# Patient Record
Sex: MALE | Race: WHITE | Employment: FULL TIME | URBAN - METROPOLITAN AREA
[De-identification: names, ages, dates, MRNs, and addresses within clinical notes are randomized per-mention and may not be internally consistent; named-entity substitution may affect disease eponyms.]

---

## 2018-10-08 ENCOUNTER — HOSPITAL ENCOUNTER (OUTPATIENT)
Dept: PREADMISSION TESTING | Age: 63
Discharge: HOME | End: 2018-10-08
Payer: COMMERCIAL

## 2018-10-08 DIAGNOSIS — Z01.818 PRE-OP TESTING: ICD-10-CM

## 2018-10-08 LAB
ALBUMIN SERPL-MCNC: 3.6 G/DL (ref 3.4–5)
ALBUMIN/GLOB SERPL: 1.6 {RATIO} (ref 0.8–1.7)
ALP SERPL-CCNC: 40 U/L (ref 45–117)
ALT SERPL-CCNC: 25 U/L (ref 16–61)
ANION GAP SERPL CALC-SCNC: 7 MMOL/L (ref 3–18)
APPEARANCE UR: CLEAR
APTT PPP: 29.3 SEC (ref 23–36.4)
AST SERPL-CCNC: 17 U/L (ref 15–37)
ATRIAL RATE: 51 BPM
BACTERIA SPEC CULT: NORMAL
BASOPHILS # BLD: 0.1 K/UL (ref 0–0.1)
BASOPHILS NFR BLD: 1 % (ref 0–2)
BILIRUB SERPL-MCNC: 0.6 MG/DL (ref 0.2–1)
BILIRUB UR QL: NEGATIVE
BUN SERPL-MCNC: 14 MG/DL (ref 7–18)
BUN/CREAT SERPL: 14 (ref 12–20)
CALCIUM SERPL-MCNC: 8.6 MG/DL (ref 8.5–10.1)
CALCULATED P AXIS, ECG09: 61 DEGREES
CALCULATED R AXIS, ECG10: 77 DEGREES
CALCULATED T AXIS, ECG11: 62 DEGREES
CHLORIDE SERPL-SCNC: 106 MMOL/L (ref 100–108)
CO2 SERPL-SCNC: 30 MMOL/L (ref 21–32)
COLOR UR: YELLOW
CREAT SERPL-MCNC: 1.02 MG/DL (ref 0.6–1.3)
DIAGNOSIS, 93000: NORMAL
DIFFERENTIAL METHOD BLD: ABNORMAL
EOSINOPHIL # BLD: 0.2 K/UL (ref 0–0.4)
EOSINOPHIL NFR BLD: 4 % (ref 0–5)
ERYTHROCYTE [DISTWIDTH] IN BLOOD BY AUTOMATED COUNT: 12.5 % (ref 11.6–14.5)
ERYTHROCYTE [SEDIMENTATION RATE] IN BLOOD: 4 MM/HR (ref 0–20)
EST. AVERAGE GLUCOSE BLD GHB EST-MCNC: 123 MG/DL
GLOBULIN SER CALC-MCNC: 2.3 G/DL (ref 2–4)
GLUCOSE SERPL-MCNC: 99 MG/DL (ref 74–99)
GLUCOSE UR STRIP.AUTO-MCNC: NEGATIVE MG/DL
HBA1C MFR BLD: 5.9 % (ref 4.5–5.6)
HCT VFR BLD AUTO: 37.5 % (ref 36–48)
HGB BLD-MCNC: 12.8 G/DL (ref 13–16)
HGB UR QL STRIP: NEGATIVE
INR PPP: 1 (ref 0.8–1.2)
KETONES UR QL STRIP.AUTO: NEGATIVE MG/DL
LEUKOCYTE ESTERASE UR QL STRIP.AUTO: NEGATIVE
LYMPHOCYTES # BLD: 1.5 K/UL (ref 0.9–3.6)
LYMPHOCYTES NFR BLD: 35 % (ref 21–52)
MCH RBC QN AUTO: 29.2 PG (ref 24–34)
MCHC RBC AUTO-ENTMCNC: 34.1 G/DL (ref 31–37)
MCV RBC AUTO: 85.4 FL (ref 74–97)
MONOCYTES # BLD: 0.4 K/UL (ref 0.05–1.2)
MONOCYTES NFR BLD: 9 % (ref 3–10)
NEUTS SEG # BLD: 2.2 K/UL (ref 1.8–8)
NEUTS SEG NFR BLD: 51 % (ref 40–73)
NITRITE UR QL STRIP.AUTO: NEGATIVE
P-R INTERVAL, ECG05: 198 MS
PH UR STRIP: 5.5 [PH] (ref 5–8)
PLATELET # BLD AUTO: 162 K/UL (ref 135–420)
PMV BLD AUTO: 9.6 FL (ref 9.2–11.8)
POTASSIUM SERPL-SCNC: 4 MMOL/L (ref 3.5–5.5)
PROT SERPL-MCNC: 5.9 G/DL (ref 6.4–8.2)
PROT UR STRIP-MCNC: NEGATIVE MG/DL
PROTHROMBIN TIME: 12.5 SEC (ref 11.5–15.2)
Q-T INTERVAL, ECG07: 442 MS
QRS DURATION, ECG06: 94 MS
QTC CALCULATION (BEZET), ECG08: 407 MS
RBC # BLD AUTO: 4.39 M/UL (ref 4.7–5.5)
SERVICE CMNT-IMP: NORMAL
SODIUM SERPL-SCNC: 143 MMOL/L (ref 136–145)
SP GR UR REFRACTOMETRY: 1.02 (ref 1–1.03)
UROBILINOGEN UR QL STRIP.AUTO: 1 EU/DL (ref 0.2–1)
VENTRICULAR RATE, ECG03: 51 BPM
WBC # BLD AUTO: 4.3 K/UL (ref 4.6–13.2)

## 2018-10-08 PROCEDURE — 85025 COMPLETE CBC W/AUTO DIFF WBC: CPT | Performed by: ORTHOPAEDIC SURGERY

## 2018-10-08 PROCEDURE — 85610 PROTHROMBIN TIME: CPT | Performed by: ORTHOPAEDIC SURGERY

## 2018-10-08 PROCEDURE — 93005 ELECTROCARDIOGRAM TRACING: CPT

## 2018-10-08 PROCEDURE — 83036 HEMOGLOBIN GLYCOSYLATED A1C: CPT | Performed by: ORTHOPAEDIC SURGERY

## 2018-10-08 PROCEDURE — 85652 RBC SED RATE AUTOMATED: CPT | Performed by: ORTHOPAEDIC SURGERY

## 2018-10-08 PROCEDURE — 80053 COMPREHEN METABOLIC PANEL: CPT | Performed by: ORTHOPAEDIC SURGERY

## 2018-10-08 PROCEDURE — 87641 MR-STAPH DNA AMP PROBE: CPT | Performed by: ORTHOPAEDIC SURGERY

## 2018-10-08 PROCEDURE — 81003 URINALYSIS AUTO W/O SCOPE: CPT | Performed by: ORTHOPAEDIC SURGERY

## 2018-10-08 PROCEDURE — 85730 THROMBOPLASTIN TIME PARTIAL: CPT | Performed by: ORTHOPAEDIC SURGERY

## 2018-10-08 PROCEDURE — 36415 COLL VENOUS BLD VENIPUNCTURE: CPT | Performed by: ORTHOPAEDIC SURGERY

## 2018-10-29 PROBLEM — M16.11 LOCALIZED OSTEOARTHROSIS OF RIGHT HIP: Chronic | Status: ACTIVE | Noted: 2018-10-29

## 2018-10-29 NOTE — DISCHARGE INSTRUCTIONS
[address] [facility]   Patient Discharge Instructions    Ryan Mrurell / [id number] : 1955    Admitted (Not on file) Discharged: 10/29/2018     IF YOU HAVE ANY PROBLEMS ONCE YOU ARE  [facility] FOLLOWING NUMBERS:   Main office number: [phone]    Your follow up appointment to see either Dr. Merrilyn Koyanagi PA-C, or Tenzin Jonas PA-C as scheduled in 2 weeks. If you are unsure of your appointment date call the office at [phone]. Medication Instructions     · Resume your home medictions as directed, you may have directed not to resume supplements until after your follow up. · A prescription for pain medication has been given   · It is important that you take the medication exactly as they are prescribed. · Keep your medication in the bottles provided by the pharmacist and keep a list of the medication names, dosages, and times to be taken in your wallet. · Do not take other medications without consulting your doctor. What to do at [address] [place] [address] Ave your prehospital diet. If you have excessive nausea or vomitting call your doctor's office. Be sure to maintain adequate fluid intake. Some pain medications may cause constipation. Remember to drink fluids, stay as active as possible, and eat plenty of fiber-rich foods. Begin In-Home Physical Therapy; 3 times a week to work on gait training, range of motion, strengthening, and weight bearing exercises as tolerable. Continue to use your walker or cane when walking. May progress from the walker to a cane to complete total bearing as tolerable. Patient may shower. Wrap incision with plastic wrap/covering to prevent incision from getting wet. Avoid complete immersion. YOUR DRESSING SHOULD BE CHANGED BY YOUR HOME HEALTH NURSE 3-5 DAYS AFTER SURGERY.           When to Call    - Call if you have a temperature greater then 101  - Unable to keep food down  - Are unable to bear any wieght   - Need a pain medication refill     Information obtained by :  I understand that if any problems occur once I am at home I am to contact my physician. I understand and acknowledge receipt of the instructions indicated above.                                                                                                                                            Physician's or R.N.'s Signature                                                                  Date/Time                                                                                                                                              Patient or Representative Signature                                                          Date/Time

## 2018-10-29 NOTE — H&P
[address] History and Physical Exam 
 
Patient: Paulie Born MRN: [id number]  SSN: [id number] YOB: 1955  Age: 58 y.o. Sex: male Subjective: Chief Complaint: right hip pain History of Present Illness:  Patient complains of right hip pain and difficulty ambulating, which has progressed over the past several months. X-rays showed osteoarthritis of the joint. The patient's pain has persisted and progressed despite conservative treatments and therapies. The patient has been previously treated with nsaids. The patient has at this time opted for surgical intervention. Past Medical History:  
Diagnosis Date  Arthritis  Coagulation disorder ([facility] Eliquis  GERD (gastroesophageal reflux disease)  Ill-defined condition O2 drops down to 84 % O2 sat. after walking 25 yards due to Oxycodone use.  Localized osteoarthrosis of right hip 10/29/2018  Sleep apnea CPAP  Thromboembolus [facility]) 2004, 2011 DVT's and PE Past Surgical History:  
Procedure Laterality Date  HX ORTHOPAEDIC  2004  
 left hip replacement  HX ORTHOPAEDIC  2004  
 left knee arthroscopy  HX ORTHOPAEDIC  2005  
 left shoulder arthroscopy  NEUROLOGICAL PROCEDURE UNLISTED  2003  
 spinal stenosis L4-5 Social History Occupational History  Not on file Tobacco Use  Smoking status: Former Smoker Last attempt to quit: 10/24/2003 Years since quitting: 15.0  Smokeless tobacco: Never Used Substance and Sexual Activity  Alcohol use: No  
  Frequency: Never  Drug use: No  
 Sexual activity: Yes Prior to Admission medications Medication Sig Start Date End Date Taking? Authorizing Provider  
raNITIdine hcl 150 mg capsule Take 150 mg by mouth two (2) times a day. Provider, Historical  
tiZANidine (ZANAFLEX) 4 mg capsule Take 4 mg by mouth every six (6) hours.     Provider, Historical  
 pregabalin (LYRICA) 150 mg capsule Take 150 mg by mouth four (4) times daily. Provider, Historical  
apixaban (ELIQUIS) 2.5 mg tablet Take 2.5 mg by mouth two (2) times a day. Provider, Historical  
traZODone (DESYREL) 100 mg tablet Take 100 mg by mouth nightly. Provider, Historical  
lovastatin (MEVACOR) 40 mg tablet Take 40 mg by mouth nightly. Provider, Historical  
oxyCODONE IR (ROXICODONE) 10 mg tab immediate release tablet Take 10 mg by mouth every eight (8) hours as needed. Provider, Historical  
melatonin tab tablet Take 10 mg by mouth nightly. Provider, Historical  
sildenafil citrate (VIAGRA) 100 mg tablet Take 50 mg by mouth as needed. Provider, Historical  
ketoconazole (NIZORAL) 2 % shampoo Apply  to affected area daily as needed for Itching. Provider, Historical  
enoxaparin (LOVENOX) 120 mg/0.8 mL injection 120 mg by SubCUTAneous route. Pt to start Lovenox 5 days prior to surgery once daily, stopping on Nov 4. Per  1610 Nathanael Bangura per pt    Provider, Historical  
 
 
Allergies: Allergies Allergen Reactions  Hydrocodone Itching  Soma [Carisoprodol] Unknown (comments) Pt can't remember  Sulfa (Sulfonamide Antibiotics) Other (comments) Skin peels Review of Systems: A comprehensive review of systems was negative except for that written in the History of Present Illness. Objective:   
  
Physical Exam: 
HEENT: Normocephalic, atraumatic Lungs:  Clear to auscultation Heart:   Regular rate and rhythm Abdomen: Soft Extremities:  Pain with range of motion of the right hip. Passive flexion  degrees, 
                     passive internal rotation 0-10 degrees, with pain throughout ROM,  
                     passive external rotation 10-20 degrees with pain at the arc of motion. Antalgic gait noted. Assessment:   
 
Arthritis of the right hip. Plan:    
 
Proceed with scheduled RIGHT TOTAL HIP ARTHROPLASTY. The various methods of treatment have been discussed with the patient and family. After consideration of risks, benefits, and other options for treatment, the patient has consented to surgical interventions. Questions were answered and preoperative teaching was done by Dr Jovani Grey. Signed By: MAXIMO Dailey October 29, 2018

## 2018-11-04 ENCOUNTER — ANESTHESIA EVENT (OUTPATIENT)
Dept: SURGERY | Age: 63
DRG: 470 | End: 2018-11-04
Payer: COMMERCIAL

## 2018-11-05 ENCOUNTER — APPOINTMENT (OUTPATIENT)
Dept: GENERAL RADIOLOGY | Age: 63
DRG: 470 | End: 2018-11-05
Attending: ORTHOPAEDIC SURGERY
Payer: COMMERCIAL

## 2018-11-05 ENCOUNTER — ANESTHESIA (OUTPATIENT)
Dept: SURGERY | Age: 63
DRG: 470 | End: 2018-11-05
Payer: COMMERCIAL

## 2018-11-05 ENCOUNTER — HOSPITAL ENCOUNTER (INPATIENT)
Age: 63
LOS: 1 days | Discharge: HOME HEALTH | DRG: 470 | End: 2018-11-06
Attending: ORTHOPAEDIC SURGERY | Admitting: ORTHOPAEDIC SURGERY
Payer: COMMERCIAL

## 2018-11-05 DIAGNOSIS — M16.11 LOCALIZED OSTEOARTHROSIS OF RIGHT HIP: Primary | Chronic | ICD-10-CM

## 2018-11-05 LAB
ABO + RH BLD: NORMAL
BLOOD GROUP ANTIBODIES SERPL: NORMAL
GLUCOSE BLD STRIP.AUTO-MCNC: 107 MG/DL (ref 70–110)
SPECIMEN EXP DATE BLD: NORMAL

## 2018-11-05 PROCEDURE — 73501 X-RAY EXAM HIP UNI 1 VIEW: CPT

## 2018-11-05 PROCEDURE — 65270000029 HC RM PRIVATE

## 2018-11-05 PROCEDURE — 77030031139 HC SUT VCRL2 J&J -A: Performed by: ORTHOPAEDIC SURGERY

## 2018-11-05 PROCEDURE — 77030033263 HC DRSG MEPILEX 16-48IN BORD MOLN -B: Performed by: ORTHOPAEDIC SURGERY

## 2018-11-05 PROCEDURE — 97161 PT EVAL LOW COMPLEX 20 MIN: CPT

## 2018-11-05 PROCEDURE — 74011250636 HC RX REV CODE- 250/636: Performed by: PHYSICIAN ASSISTANT

## 2018-11-05 PROCEDURE — 82962 GLUCOSE BLOOD TEST: CPT

## 2018-11-05 PROCEDURE — C1776 JOINT DEVICE (IMPLANTABLE): HCPCS | Performed by: ORTHOPAEDIC SURGERY

## 2018-11-05 PROCEDURE — 77030038010: Performed by: ORTHOPAEDIC SURGERY

## 2018-11-05 PROCEDURE — 77030032490 HC SLV COMPR SCD KNE COVD -B: Performed by: ORTHOPAEDIC SURGERY

## 2018-11-05 PROCEDURE — 77030013708 HC HNDPC SUC IRR PULS STRY –B: Performed by: ORTHOPAEDIC SURGERY

## 2018-11-05 PROCEDURE — 77030034694 HC SCPL CANADY PLSM DISP USMD -E: Performed by: ORTHOPAEDIC SURGERY

## 2018-11-05 PROCEDURE — 74011000250 HC RX REV CODE- 250: Performed by: ORTHOPAEDIC SURGERY

## 2018-11-05 PROCEDURE — 74011250637 HC RX REV CODE- 250/637: Performed by: PHYSICIAN ASSISTANT

## 2018-11-05 PROCEDURE — 74011250636 HC RX REV CODE- 250/636: Performed by: ORTHOPAEDIC SURGERY

## 2018-11-05 PROCEDURE — 77030018836 HC SOL IRR NACL ICUM -A: Performed by: ORTHOPAEDIC SURGERY

## 2018-11-05 PROCEDURE — 76210000006 HC OR PH I REC 0.5 TO 1 HR: Performed by: ORTHOPAEDIC SURGERY

## 2018-11-05 PROCEDURE — 0SR904A REPLACEMENT OF RIGHT HIP JOINT WITH CERAMIC ON POLYETHYLENE SYNTHETIC SUBSTITUTE, UNCEMENTED, OPEN APPROACH: ICD-10-PCS | Performed by: ORTHOPAEDIC SURGERY

## 2018-11-05 PROCEDURE — 74011250637 HC RX REV CODE- 250/637: Performed by: SPECIALIST

## 2018-11-05 PROCEDURE — 86900 BLOOD TYPING SEROLOGIC ABO: CPT | Performed by: ORTHOPAEDIC SURGERY

## 2018-11-05 PROCEDURE — 77030003666 HC NDL SPINAL BD -A: Performed by: ORTHOPAEDIC SURGERY

## 2018-11-05 PROCEDURE — 77030027138 HC INCENT SPIROMETER -A: Performed by: ORTHOPAEDIC SURGERY

## 2018-11-05 PROCEDURE — 77010033678 HC OXYGEN DAILY

## 2018-11-05 PROCEDURE — 77030020782 HC GWN BAIR PAWS FLX 3M -B: Performed by: ORTHOPAEDIC SURGERY

## 2018-11-05 PROCEDURE — 77030012890

## 2018-11-05 PROCEDURE — 76010000153 HC OR TIME 1.5 TO 2 HR: Performed by: ORTHOPAEDIC SURGERY

## 2018-11-05 PROCEDURE — 36415 COLL VENOUS BLD VENIPUNCTURE: CPT | Performed by: ORTHOPAEDIC SURGERY

## 2018-11-05 PROCEDURE — 74011250636 HC RX REV CODE- 250/636

## 2018-11-05 PROCEDURE — 77030039267 HC ADH SKN EXOFIN S2SG -B: Performed by: ORTHOPAEDIC SURGERY

## 2018-11-05 PROCEDURE — 76060000034 HC ANESTHESIA 1.5 TO 2 HR: Performed by: ORTHOPAEDIC SURGERY

## 2018-11-05 PROCEDURE — 74011000258 HC RX REV CODE- 258: Performed by: ORTHOPAEDIC SURGERY

## 2018-11-05 PROCEDURE — 77030002934 HC SUT MCRYL J&J -B: Performed by: ORTHOPAEDIC SURGERY

## 2018-11-05 PROCEDURE — 97116 GAIT TRAINING THERAPY: CPT

## 2018-11-05 DEVICE — LINER ACET OD58MM ID36MM HIP ALTRX PINN: Type: IMPLANTABLE DEVICE | Site: HIP | Status: FUNCTIONAL

## 2018-11-05 DEVICE — CUP ACET DIA58MM HIP GRIPTION PRI CEMENTLESS FIX SECT SER: Type: IMPLANTABLE DEVICE | Site: HIP | Status: FUNCTIONAL

## 2018-11-05 DEVICE — COMPONENT TOT HIP PRIMARY CERM ALTRX: Type: IMPLANTABLE DEVICE | Site: HIP | Status: FUNCTIONAL

## 2018-11-05 DEVICE — HEAD FEM DIA36MM +5MM OFFSET 12/14 TAPR HIP CERAMIC BIOLOX: Type: IMPLANTABLE DEVICE | Site: HIP | Status: FUNCTIONAL

## 2018-11-05 RX ORDER — OXYCODONE AND ACETAMINOPHEN 5; 325 MG/1; MG/1
TABLET ORAL
Qty: 60 TAB | Refills: 0 | Status: SHIPPED | OUTPATIENT
Start: 2018-11-05

## 2018-11-05 RX ORDER — ZOLPIDEM TARTRATE 5 MG/1
5-10 TABLET ORAL
Status: DISCONTINUED | OUTPATIENT
Start: 2018-11-05 | End: 2018-11-06 | Stop reason: HOSPADM

## 2018-11-05 RX ORDER — MAGNESIUM SULFATE 100 %
4 CRYSTALS MISCELLANEOUS AS NEEDED
Status: DISCONTINUED | OUTPATIENT
Start: 2018-11-05 | End: 2018-11-05 | Stop reason: HOSPADM

## 2018-11-05 RX ORDER — PREGABALIN 75 MG/1
150 CAPSULE ORAL 4 TIMES DAILY
Status: DISCONTINUED | OUTPATIENT
Start: 2018-11-05 | End: 2018-11-06 | Stop reason: HOSPADM

## 2018-11-05 RX ORDER — CEFAZOLIN SODIUM/WATER 2 G/20 ML
2 SYRINGE (ML) INTRAVENOUS EVERY 8 HOURS
Status: COMPLETED | OUTPATIENT
Start: 2018-11-05 | End: 2018-11-06

## 2018-11-05 RX ORDER — TRAZODONE HYDROCHLORIDE 100 MG/1
100 TABLET ORAL
Status: DISCONTINUED | OUTPATIENT
Start: 2018-11-05 | End: 2018-11-06 | Stop reason: HOSPADM

## 2018-11-05 RX ORDER — ONDANSETRON 2 MG/ML
4 INJECTION INTRAMUSCULAR; INTRAVENOUS
Status: DISCONTINUED | OUTPATIENT
Start: 2018-11-05 | End: 2018-11-05

## 2018-11-05 RX ORDER — DIPHENHYDRAMINE HCL 25 MG
25 CAPSULE ORAL
Status: DISCONTINUED | OUTPATIENT
Start: 2018-11-05 | End: 2018-11-06 | Stop reason: HOSPADM

## 2018-11-05 RX ORDER — SODIUM CHLORIDE 0.9 % (FLUSH) 0.9 %
5-10 SYRINGE (ML) INJECTION AS NEEDED
Status: DISCONTINUED | OUTPATIENT
Start: 2018-11-05 | End: 2018-11-05 | Stop reason: HOSPADM

## 2018-11-05 RX ORDER — DOCUSATE SODIUM 100 MG/1
100 CAPSULE, LIQUID FILLED ORAL 2 TIMES DAILY
Status: DISCONTINUED | OUTPATIENT
Start: 2018-11-05 | End: 2018-11-06 | Stop reason: HOSPADM

## 2018-11-05 RX ORDER — SODIUM CHLORIDE, SODIUM LACTATE, POTASSIUM CHLORIDE, CALCIUM CHLORIDE 600; 310; 30; 20 MG/100ML; MG/100ML; MG/100ML; MG/100ML
100 INJECTION, SOLUTION INTRAVENOUS CONTINUOUS
Status: DISCONTINUED | OUTPATIENT
Start: 2018-11-05 | End: 2018-11-05 | Stop reason: HOSPADM

## 2018-11-05 RX ORDER — SODIUM CHLORIDE 0.9 % (FLUSH) 0.9 %
5-10 SYRINGE (ML) INJECTION AS NEEDED
Status: DISCONTINUED | OUTPATIENT
Start: 2018-11-05 | End: 2018-11-06 | Stop reason: HOSPADM

## 2018-11-05 RX ORDER — HYDROMORPHONE HYDROCHLORIDE 2 MG/ML
INJECTION, SOLUTION INTRAMUSCULAR; INTRAVENOUS; SUBCUTANEOUS AS NEEDED
Status: DISCONTINUED | OUTPATIENT
Start: 2018-11-05 | End: 2018-11-05 | Stop reason: HOSPADM

## 2018-11-05 RX ORDER — LOVASTATIN 20 MG/1
40 TABLET ORAL
Status: DISCONTINUED | OUTPATIENT
Start: 2018-11-05 | End: 2018-11-06 | Stop reason: HOSPADM

## 2018-11-05 RX ORDER — RANITIDINE 150 MG/1
150 TABLET, FILM COATED ORAL 2 TIMES DAILY
Status: DISCONTINUED | OUTPATIENT
Start: 2018-11-05 | End: 2018-11-06 | Stop reason: HOSPADM

## 2018-11-05 RX ORDER — METOCLOPRAMIDE HYDROCHLORIDE 5 MG/ML
10 INJECTION INTRAMUSCULAR; INTRAVENOUS
Status: DISCONTINUED | OUTPATIENT
Start: 2018-11-05 | End: 2018-11-06 | Stop reason: HOSPADM

## 2018-11-05 RX ORDER — FENTANYL CITRATE 50 UG/ML
25 INJECTION, SOLUTION INTRAMUSCULAR; INTRAVENOUS AS NEEDED
Status: DISCONTINUED | OUTPATIENT
Start: 2018-11-05 | End: 2018-11-05 | Stop reason: HOSPADM

## 2018-11-05 RX ORDER — DEXTROSE 50 % IN WATER (D50W) INTRAVENOUS SYRINGE
25-50 AS NEEDED
Status: DISCONTINUED | OUTPATIENT
Start: 2018-11-05 | End: 2018-11-05 | Stop reason: HOSPADM

## 2018-11-05 RX ORDER — ONDANSETRON 2 MG/ML
4 INJECTION INTRAMUSCULAR; INTRAVENOUS ONCE
Status: DISCONTINUED | OUTPATIENT
Start: 2018-11-05 | End: 2018-11-05 | Stop reason: HOSPADM

## 2018-11-05 RX ORDER — TIZANIDINE 4 MG/1
4 TABLET ORAL EVERY 6 HOURS
Status: DISCONTINUED | OUTPATIENT
Start: 2018-11-05 | End: 2018-11-06 | Stop reason: HOSPADM

## 2018-11-05 RX ORDER — LANOLIN ALCOHOL/MO/W.PET/CERES
1 CREAM (GRAM) TOPICAL 3 TIMES DAILY
Status: DISCONTINUED | OUTPATIENT
Start: 2018-11-05 | End: 2018-11-06 | Stop reason: HOSPADM

## 2018-11-05 RX ORDER — CHOLECALCIFEROL (VITAMIN D3) 125 MCG
10 CAPSULE ORAL
Status: DISCONTINUED | OUTPATIENT
Start: 2018-11-05 | End: 2018-11-06 | Stop reason: HOSPADM

## 2018-11-05 RX ORDER — HYDROMORPHONE HYDROCHLORIDE 2 MG/ML
0.5 INJECTION, SOLUTION INTRAMUSCULAR; INTRAVENOUS; SUBCUTANEOUS
Status: DISCONTINUED | OUTPATIENT
Start: 2018-11-05 | End: 2018-11-05 | Stop reason: HOSPADM

## 2018-11-05 RX ORDER — IBUPROFEN 200 MG
800 TABLET ORAL
COMMUNITY
End: 2018-11-06

## 2018-11-05 RX ORDER — KETAMINE HYDROCHLORIDE 10 MG/ML
INJECTION, SOLUTION INTRAMUSCULAR; INTRAVENOUS AS NEEDED
Status: DISCONTINUED | OUTPATIENT
Start: 2018-11-05 | End: 2018-11-05 | Stop reason: HOSPADM

## 2018-11-05 RX ORDER — ONDANSETRON 2 MG/ML
INJECTION INTRAMUSCULAR; INTRAVENOUS AS NEEDED
Status: DISCONTINUED | OUTPATIENT
Start: 2018-11-05 | End: 2018-11-05 | Stop reason: HOSPADM

## 2018-11-05 RX ORDER — PREGABALIN 75 MG/1
150 CAPSULE ORAL
Status: COMPLETED | OUTPATIENT
Start: 2018-11-05 | End: 2018-11-05

## 2018-11-05 RX ORDER — MIDAZOLAM HYDROCHLORIDE 1 MG/ML
INJECTION, SOLUTION INTRAMUSCULAR; INTRAVENOUS AS NEEDED
Status: DISCONTINUED | OUTPATIENT
Start: 2018-11-05 | End: 2018-11-05 | Stop reason: HOSPADM

## 2018-11-05 RX ORDER — DEXMEDETOMIDINE HYDROCHLORIDE 4 UG/ML
INJECTION, SOLUTION INTRAVENOUS AS NEEDED
Status: DISCONTINUED | OUTPATIENT
Start: 2018-11-05 | End: 2018-11-05 | Stop reason: HOSPADM

## 2018-11-05 RX ORDER — PREGABALIN 50 MG/1
50 CAPSULE ORAL ONCE
Status: DISCONTINUED | OUTPATIENT
Start: 2018-11-05 | End: 2018-11-05 | Stop reason: DRUGHIGH

## 2018-11-05 RX ORDER — LIDOCAINE HYDROCHLORIDE 20 MG/ML
INJECTION, SOLUTION EPIDURAL; INFILTRATION; INTRACAUDAL; PERINEURAL AS NEEDED
Status: DISCONTINUED | OUTPATIENT
Start: 2018-11-05 | End: 2018-11-05 | Stop reason: HOSPADM

## 2018-11-05 RX ORDER — PHENYLEPHRINE HCL IN 0.9% NACL 1 MG/10 ML
SYRINGE (ML) INTRAVENOUS AS NEEDED
Status: DISCONTINUED | OUTPATIENT
Start: 2018-11-05 | End: 2018-11-05 | Stop reason: HOSPADM

## 2018-11-05 RX ORDER — CEFAZOLIN SODIUM/WATER 2 G/20 ML
2 SYRINGE (ML) INTRAVENOUS ONCE
Status: COMPLETED | OUTPATIENT
Start: 2018-11-05 | End: 2018-11-05

## 2018-11-05 RX ORDER — NALOXONE HYDROCHLORIDE 0.4 MG/ML
0.4 INJECTION, SOLUTION INTRAMUSCULAR; INTRAVENOUS; SUBCUTANEOUS AS NEEDED
Status: DISCONTINUED | OUTPATIENT
Start: 2018-11-05 | End: 2018-11-06 | Stop reason: HOSPADM

## 2018-11-05 RX ORDER — DEXAMETHASONE SODIUM PHOSPHATE 4 MG/ML
4 INJECTION, SOLUTION INTRA-ARTICULAR; INTRALESIONAL; INTRAMUSCULAR; INTRAVENOUS; SOFT TISSUE ONCE
Status: COMPLETED | OUTPATIENT
Start: 2018-11-05 | End: 2018-11-05

## 2018-11-05 RX ORDER — ACETAMINOPHEN 10 MG/ML
1000 INJECTION, SOLUTION INTRAVENOUS EVERY 6 HOURS
Status: DISCONTINUED | OUTPATIENT
Start: 2018-11-05 | End: 2018-11-05 | Stop reason: DRUGHIGH

## 2018-11-05 RX ORDER — KETOROLAC TROMETHAMINE 30 MG/ML
30 INJECTION, SOLUTION INTRAMUSCULAR; INTRAVENOUS
Status: DISCONTINUED | OUTPATIENT
Start: 2018-11-05 | End: 2018-11-05 | Stop reason: HOSPADM

## 2018-11-05 RX ORDER — ESMOLOL HYDROCHLORIDE 10 MG/ML
INJECTION INTRAVENOUS AS NEEDED
Status: DISCONTINUED | OUTPATIENT
Start: 2018-11-05 | End: 2018-11-05 | Stop reason: HOSPADM

## 2018-11-05 RX ORDER — OXYCODONE HYDROCHLORIDE 5 MG/1
5-10 TABLET ORAL
Status: DISCONTINUED | OUTPATIENT
Start: 2018-11-05 | End: 2018-11-06 | Stop reason: HOSPADM

## 2018-11-05 RX ORDER — OXYCODONE HYDROCHLORIDE 5 MG/1
5 TABLET ORAL
Status: DISCONTINUED | OUTPATIENT
Start: 2018-11-05 | End: 2018-11-05 | Stop reason: HOSPADM

## 2018-11-05 RX ORDER — ONDANSETRON 2 MG/ML
4 INJECTION INTRAMUSCULAR; INTRAVENOUS
Status: DISCONTINUED | OUTPATIENT
Start: 2018-11-05 | End: 2018-11-06 | Stop reason: HOSPADM

## 2018-11-05 RX ORDER — SODIUM CHLORIDE 0.9 % (FLUSH) 0.9 %
5-10 SYRINGE (ML) INJECTION EVERY 8 HOURS
Status: DISCONTINUED | OUTPATIENT
Start: 2018-11-05 | End: 2018-11-06 | Stop reason: HOSPADM

## 2018-11-05 RX ORDER — EPHEDRINE SULFATE/0.9% NACL/PF 25 MG/5 ML
SYRINGE (ML) INTRAVENOUS AS NEEDED
Status: DISCONTINUED | OUTPATIENT
Start: 2018-11-05 | End: 2018-11-05 | Stop reason: HOSPADM

## 2018-11-05 RX ORDER — SODIUM CHLORIDE 9 MG/ML
125 INJECTION, SOLUTION INTRAVENOUS CONTINUOUS
Status: DISCONTINUED | OUTPATIENT
Start: 2018-11-05 | End: 2018-11-06 | Stop reason: HOSPADM

## 2018-11-05 RX ORDER — PANTOPRAZOLE SODIUM 40 MG/1
40 TABLET, DELAYED RELEASE ORAL DAILY
Status: DISCONTINUED | OUTPATIENT
Start: 2018-11-06 | End: 2018-11-06 | Stop reason: HOSPADM

## 2018-11-05 RX ORDER — ACETAMINOPHEN 500 MG
1000 TABLET ORAL ONCE
Status: COMPLETED | OUTPATIENT
Start: 2018-11-05 | End: 2018-11-05

## 2018-11-05 RX ORDER — PROPOFOL 10 MG/ML
INJECTION, EMULSION INTRAVENOUS AS NEEDED
Status: DISCONTINUED | OUTPATIENT
Start: 2018-11-05 | End: 2018-11-05 | Stop reason: HOSPADM

## 2018-11-05 RX ORDER — ACETAMINOPHEN 325 MG/1
650 TABLET ORAL EVERY 6 HOURS
Status: DISCONTINUED | OUTPATIENT
Start: 2018-11-05 | End: 2018-11-06 | Stop reason: HOSPADM

## 2018-11-05 RX ORDER — SODIUM CHLORIDE, SODIUM LACTATE, POTASSIUM CHLORIDE, CALCIUM CHLORIDE 600; 310; 30; 20 MG/100ML; MG/100ML; MG/100ML; MG/100ML
125 INJECTION, SOLUTION INTRAVENOUS CONTINUOUS
Status: DISCONTINUED | OUTPATIENT
Start: 2018-11-05 | End: 2018-11-06 | Stop reason: HOSPADM

## 2018-11-05 RX ORDER — DIPHENHYDRAMINE HYDROCHLORIDE 50 MG/ML
12.5 INJECTION, SOLUTION INTRAMUSCULAR; INTRAVENOUS
Status: DISCONTINUED | OUTPATIENT
Start: 2018-11-05 | End: 2018-11-06 | Stop reason: HOSPADM

## 2018-11-05 RX ORDER — GLYCOPYRROLATE 0.2 MG/ML
INJECTION INTRAMUSCULAR; INTRAVENOUS AS NEEDED
Status: DISCONTINUED | OUTPATIENT
Start: 2018-11-05 | End: 2018-11-05 | Stop reason: HOSPADM

## 2018-11-05 RX ADMIN — OXYCODONE HYDROCHLORIDE 5 MG: 5 TABLET ORAL at 19:46

## 2018-11-05 RX ADMIN — TRAZODONE HYDROCHLORIDE 100 MG: 100 TABLET ORAL at 22:19

## 2018-11-05 RX ADMIN — HYDROMORPHONE HYDROCHLORIDE 1 MG: 2 INJECTION, SOLUTION INTRAMUSCULAR; INTRAVENOUS; SUBCUTANEOUS at 12:59

## 2018-11-05 RX ADMIN — SODIUM CHLORIDE 125 ML/HR: 900 INJECTION, SOLUTION INTRAVENOUS at 19:55

## 2018-11-05 RX ADMIN — ACETAMINOPHEN 650 MG: 325 TABLET ORAL at 19:47

## 2018-11-05 RX ADMIN — Medication 2 G: at 12:56

## 2018-11-05 RX ADMIN — DEXMEDETOMIDINE HYDROCHLORIDE 8 MCG: 4 INJECTION, SOLUTION INTRAVENOUS at 13:20

## 2018-11-05 RX ADMIN — Medication 2 G: at 22:18

## 2018-11-05 RX ADMIN — Medication 100 MCG: at 12:54

## 2018-11-05 RX ADMIN — TRANEXAMIC ACID 1 G: 100 INJECTION, SOLUTION INTRAVENOUS at 12:55

## 2018-11-05 RX ADMIN — PREGABALIN 150 MG: 75 CAPSULE ORAL at 18:03

## 2018-11-05 RX ADMIN — DEXMEDETOMIDINE HYDROCHLORIDE 8 MCG: 4 INJECTION, SOLUTION INTRAVENOUS at 13:05

## 2018-11-05 RX ADMIN — DEXMEDETOMIDINE HYDROCHLORIDE 8 MCG: 4 INJECTION, SOLUTION INTRAVENOUS at 13:30

## 2018-11-05 RX ADMIN — Medication 10 MG: at 22:19

## 2018-11-05 RX ADMIN — PROPOFOL 250 MG: 10 INJECTION, EMULSION INTRAVENOUS at 12:50

## 2018-11-05 RX ADMIN — GLYCOPYRROLATE 0.2 MG: 0.2 INJECTION INTRAMUSCULAR; INTRAVENOUS at 12:46

## 2018-11-05 RX ADMIN — DEXAMETHASONE SODIUM PHOSPHATE 4 MG: 4 INJECTION, SOLUTION INTRAMUSCULAR; INTRAVENOUS at 12:36

## 2018-11-05 RX ADMIN — RANITIDINE 150 MG: 150 TABLET ORAL at 22:19

## 2018-11-05 RX ADMIN — DEXMEDETOMIDINE HYDROCHLORIDE 8 MCG: 4 INJECTION, SOLUTION INTRAVENOUS at 13:32

## 2018-11-05 RX ADMIN — LOVASTATIN 40 MG: 20 TABLET ORAL at 22:26

## 2018-11-05 RX ADMIN — HYDROMORPHONE HYDROCHLORIDE 1 MG: 2 INJECTION, SOLUTION INTRAMUSCULAR; INTRAVENOUS; SUBCUTANEOUS at 13:22

## 2018-11-05 RX ADMIN — TIZANIDINE 4 MG: 4 TABLET ORAL at 18:03

## 2018-11-05 RX ADMIN — TRANEXAMIC ACID 1 G: 100 INJECTION, SOLUTION INTRAVENOUS at 14:03

## 2018-11-05 RX ADMIN — SODIUM CHLORIDE, SODIUM LACTATE, POTASSIUM CHLORIDE, AND CALCIUM CHLORIDE 125 ML/HR: 600; 310; 30; 20 INJECTION, SOLUTION INTRAVENOUS at 11:54

## 2018-11-05 RX ADMIN — SODIUM CHLORIDE, SODIUM LACTATE, POTASSIUM CHLORIDE, AND CALCIUM CHLORIDE: 600; 310; 30; 20 INJECTION, SOLUTION INTRAVENOUS at 14:03

## 2018-11-05 RX ADMIN — FERROUS SULFATE TAB 325 MG (65 MG ELEMENTAL FE) 325 MG: 325 (65 FE) TAB at 18:03

## 2018-11-05 RX ADMIN — KETAMINE HYDROCHLORIDE 50 MG: 10 INJECTION, SOLUTION INTRAMUSCULAR; INTRAVENOUS at 13:00

## 2018-11-05 RX ADMIN — FERROUS SULFATE TAB 325 MG (65 MG ELEMENTAL FE) 325 MG: 325 (65 FE) TAB at 22:19

## 2018-11-05 RX ADMIN — PREGABALIN 150 MG: 75 CAPSULE ORAL at 22:23

## 2018-11-05 RX ADMIN — KETAMINE HYDROCHLORIDE 20 MG: 10 INJECTION, SOLUTION INTRAMUSCULAR; INTRAVENOUS at 13:22

## 2018-11-05 RX ADMIN — LIDOCAINE HYDROCHLORIDE 100 MG: 20 INJECTION, SOLUTION EPIDURAL; INFILTRATION; INTRACAUDAL; PERINEURAL at 12:50

## 2018-11-05 RX ADMIN — ACETAMINOPHEN 1000 MG: 500 TABLET, FILM COATED ORAL at 12:36

## 2018-11-05 RX ADMIN — Medication 10 MG: at 14:10

## 2018-11-05 RX ADMIN — MIDAZOLAM HYDROCHLORIDE 4 MG: 1 INJECTION, SOLUTION INTRAMUSCULAR; INTRAVENOUS at 12:46

## 2018-11-05 RX ADMIN — ESMOLOL HYDROCHLORIDE 50 MG: 10 INJECTION INTRAVENOUS at 13:37

## 2018-11-05 RX ADMIN — PREGABALIN 150 MG: 75 CAPSULE ORAL at 12:36

## 2018-11-05 RX ADMIN — ONDANSETRON 4 MG: 2 INJECTION INTRAMUSCULAR; INTRAVENOUS at 12:56

## 2018-11-05 RX ADMIN — DEXMEDETOMIDINE HYDROCHLORIDE 8 MCG: 4 INJECTION, SOLUTION INTRAVENOUS at 13:22

## 2018-11-05 RX ADMIN — DOCUSATE SODIUM 100 MG: 100 CAPSULE, LIQUID FILLED ORAL at 22:19

## 2018-11-05 NOTE — OP NOTES
[address] [facility] [address] Medicine  Total Hip Arthroplasty      Patient: Xu Hanson MRN: [id number]  SSN: [id number]    YOB: 1955  Age: 58 y.o. Sex: male      Date of Surgery: 11/5/2018   Preoperative Diagnosis: UNILATERAL PRIMARY OSTEOARTHRITIS,RIGHT HIP   Postoperative Diagnosis: UNILATERAL PRIMARY OSTEOARTHRITIS,RIGHT HIP   Location: [facility]  Surgeon: Fatou Preciado MD  Assistant: Sebastian Amador PA-C    Anesthesia: general    Procedure: Total Right Hip Arthroplasty    Findings: Degenerative joint disease of the right hip. Estimated Blood Loss: 300ml    Specimens: None    Complications: none    Implants:   Implant Name Type Inv. Item Serial No.  Lot No. LRB No. Used Action   CUP ACET SECTOR GRIPTION 58MM -- TI - [id number]  CUP ACET SECTOR GRIPTION 58MM -- TI  [facility] [employer] [id number] Right 1 Implanted   LINER ACET PINN NEUT [id number] -- ALTRX - [id number]  LINER ACET PINN NEUT [id number] -- ALTRX  [facility] [id number] Right 1 Implanted   FEMORAL STEM    [employer] OR[employer]O [id number] Right 1 Implanted   HEAD FEM S-ROM 36MM +5MM NK -- [employer] DELTA - [id number]  HEAD FEM S-ROM 36MM +5MM NK -- BIOLOX DELTA  [facility] [employer] [id number] Right 1 Implanted       Procedure Detail:  After the patient was brought to the operating suite, He was effectively anesthetized using general anesthesia, then transferred to the [place] table and secured in a standard fashion. His right hip was then prepped and draped in a normal sterile orthopedic fashion. He was given appropriate intravenous antibiotics preoperatively. After a proper timeout was performed, a direct anterior approach to the hip was performed using a short Pacheco-Mandel interval. Anterior capsulotomy was performed. The degenerative changes of the hip were noted. Femoral neck osteotomy was then performed to the templated area. The head and neck were removed. The pulvinar and labrum were excised.  The acetabulum was then reamed up to 58 mm with good bleeding cancellous bone obtained. The cup was then irrigated with pulse lavage system. A 58 mm Gription cup was then impacted in place with excellent stable fixation obtained, placing the cup at about 45 degrees of abduction, 20 degrees of anteversion. The liner was then impacted in place. A screw was not placed. Attention was turned to the femur, which was delivered into the wound with a combination of extension, external rotation, and adduction, and using the hook on the [place] table to deliver the femur into the wound. The canal was broached up to a size 6 for the Actis stem system with excellent stable fixation obtained. A trial reduction was then performed with the standard neck offset and 36 mm head balls with various neck lengths. With the +5, he appeared to have equalization of leg lengths and restoration of offset radiographically using the c-arm and joint point navigation system, and excellent functional stability was noted. The trial broach was removed. The canal was irrigated with the pulse lavage system. The final components were impacted in place with excellent stable fixation obtained once again. The final reduction was performed and once again leg lengths and offset were restored radiographically, using the C-arm radiographically intraoperatively, and excellent functional stability was noted. The wound was then irrigated one more time, and then closed in layers. The fascia of the tensor was closed with #1 Vicryl in a running type stitch. Subcutaneous tissue was closed with 2-0 Vicryl in a simple buried stitch, and the skin was closed with Prineo. Dry, sterile dressing was then applied. He tolerated this well, was transferred to the bed, and taken to recovery room, extubated, in stable condition. All sponge and needle counts were correct.     Signed By: Emilio Wyatt MD     November 5, 2018

## 2018-11-05 NOTE — INTERVAL H&P NOTE
H&P Update: 
Michael Dickson was seen and examined. History and physical has been reviewed. The patient has been examined.  There have been no significant clinical changes since the completion of the originally dated History and Physical. 
 
Signed By: Francesco Gee MD   
 November 5, 2018 11:28 AM

## 2018-11-05 NOTE — DISCHARGE SUMMARY
[address] [address]     DISCHARGE SUMMARY     PATIENT: Shama Sheriff     MRN: [id number]   ADMIT DATE: 2018   [place]   DISCHARGE DATE:      ATTENDING: Rosetta Castillo MD   DICTATING: MAXIMO Heath     ADMISSION DIAGNOSIS: UNILATERAL PRIMARY OSTEOARTHRITIS,RIGHT HIP  Arthritis of right hip    DISCHARGE DIAGNOSIS: Status post RIGHT TOTAL HIP ARTHROPLASTY    HISTORY OF PRESENT ILLNESS: The patient is a 58y.o. year-old male   with ongoing right hip pain secondary to osteoarthritis of right hip. The patient's pain has persisted and progressed despite conservative treatments and therapies. The patient has at this time opted for surgical intervention. PAST MEDICAL HISTORY:   Past Medical History:   Diagnosis Date    Arthritis     Coagulation disorder ([address]     Eliquis    GERD (gastroesophageal reflux disease)     Ill-defined condition     O2 drops down to 84 % O2 sat. after walking 25 yards due to Oxycodone use.  Localized osteoarthrosis of right hip 10/29/2018    Sleep apnea     CPAP    Thromboembolus ([address] ,     DVT's and PE       PAST SURGICAL HISTORY:   Past Surgical History:   Procedure Laterality Date    HX ORTHOPAEDIC  2004    left hip replacement    HX ORTHOPAEDIC      left knee arthroscopy    HX ORTHOPAEDIC      left shoulder arthroscopy    NEUROLOGICAL PROCEDURE UNLISTED      spinal stenosis L4-5       ALLERGIES:   Allergies   Allergen Reactions    Hydrocodone Itching    Soma [Carisoprodol] Unknown (comments)     Pt can't remember    Sulfa (Sulfonamide Antibiotics) Other (comments)     Skin peels        CURRENT MEDICATIONS:  A list of medications prior to the time of admission include:  Prior to Admission medications    Medication Sig Start Date End Date Taking?  Authorizing Provider   oxyCODONE-acetaminophen (PERCOCET) 5-325 mg per tablet Take 1 to 2 tab PO q 4-6 hrs prn pain 18 Yes Luis Fernando Durham PA   raNITIdine hcl 150 mg capsule Take 150 mg by mouth two (2) times a day. Yes Provider, Historical   tiZANidine (ZANAFLEX) 4 mg capsule Take 4 mg by mouth every six (6) hours. Yes Provider, Historical   pregabalin (LYRICA) 150 mg capsule Take 150 mg by mouth four (4) times daily. Yes Provider, Historical   traZODone (DESYREL) 100 mg tablet Take 100 mg by mouth nightly. Yes Provider, Historical   lovastatin (MEVACOR) 40 mg tablet Take 40 mg by mouth nightly. Yes Provider, Historical   oxyCODONE IR (ROXICODONE) 10 mg tab immediate release tablet Take 10 mg by mouth every eight (8) hours as needed. Yes Provider, Historical   melatonin tab tablet Take 10 mg by mouth nightly. Yes Provider, Historical   ketoconazole (NIZORAL) 2 % shampoo Apply  to affected area daily as needed for Itching. Yes Provider, Historical   enoxaparin (LOVENOX) 120 mg/0.8 mL injection 120 mg by SubCUTAneous route two (2) times a day. Pt to start Lovenox 5 days prior to surgery once daily, stopping on Nov 4. Per Dr. Yancy Masterson per pt to bridge   Pt started on 10/31/18   Yes Provider, Historical   ibuprofen (ADVIL) 200 mg tablet Take 800 mg by mouth two (2) times daily as needed for Pain. Provider, Historical   apixaban (ELIQUIS) 2.5 mg tablet Take 2.5 mg by mouth two (2) times a day. Provider, Historical   sildenafil citrate (VIAGRA) 100 mg tablet Take 50 mg by mouth as needed. Provider, Historical       FAMILY HISTORY: History reviewed. No pertinent family history.     SOCIAL HISTORY:   Social History     Socioeconomic History    Marital status:      Spouse name: Not on file    Number of children: Not on file    Years of education: Not on file    Highest education level: Not on file   Social Needs    Financial resource strain: Not on file    Food insecurity - worry: Not on file    Food insecurity - inability: Not on file    Transportation needs - medical: Not on file   [employer] needs - non-medical: Not on file   Occupational History    Not on file   Tobacco Use    Smoking status: Former Smoker     Last attempt to quit: 10/24/2003     Years since quitting: 15.0    Smokeless tobacco: Never Used   Substance and Sexual Activity    Alcohol use: No     Frequency: Never    Drug use: No    Sexual activity: Yes   Other Topics Concern    Not on file   Social History Narrative    Not on file       REVIEW OF SYSTEMS: All review of systems are negative. PHYSICAL EXAMINATION: For a detailed physical exam, please refer to the patient's chart. HOSPITAL COURSE: The patient was taken to surgery the day of admission. he underwent right total hip replacement via the anterior approach. Operative course was benign. Estimated blood loss approximately 300 cc. The patient was taken to the PACU in stable condition and was later taken to the floor in stable condition. Post-op Day #1, patient has done very well.  he has had little to no pain. he had been cleared by physical therapy with stair training. he was placed on Eliquis for DVT prophylaxis. his vitals have remained stable. he has also remained hemodynamically stable. The patient has been recommended for discharge home. DISCHARGE INSTRUCTIONS: The patient is to be discharged home. he is to continue on his prior medications per the medication reconciliation form, to which we will add:           1)  Percocet 5/325 mg; 1-2 tablets p.o. every 4 to 6 hours p.r.n. for pain    The patient is to continue at home with home physical therapy 3 times a week to work on gait training, range of motion, strengthening, and weightbearing exercises as tolerated on his right lower extremity. The patient is to progress from a walker to a cane to complete total weightbearing as tolerable. The patient is to continue to keep his incision dry.   The patient is to followup with Dr. Melissa Schwarz, [facility] PA-C, and/or [facility] in the office approximately 10-14 days status post for x-rays and further evaluation.       Daquan Davila 1723, [place]  11/06/18  7:13 AM

## 2018-11-05 NOTE — PERIOP NOTES
/Bulmaro at [facility], he was notified that pt took last dose of Eliquis on 10/630/18, started bridge with Lovenox 120 mg BID on 10/31/18, last dose of Lovenox was 11/04/18 @ 2200. Orders for IV Tranexamic acid see orders  and no IVF bolus at this time

## 2018-11-05 NOTE — ANESTHESIA PREPROCEDURE EVALUATION
Anesthetic History No history of anesthetic complications Pertinent negatives: No PONV Review of Systems / Medical History Patient summary reviewed, nursing notes reviewed and pertinent labs reviewed Pulmonary Sleep apnea: CPAP Pertinent negatives: No COPD, asthma and smoker Neuro/Psych Cardiovascular Pertinent negatives: No hypertension, past MI and CAD Comments: PE  
GI/Hepatic/Renal 
  
GERD Pertinent negatives: No liver disease and renal disease Endo/Other Arthritis Pertinent negatives: No diabetes Other Findings Comments: etoh neg Physical Exam 
 
Airway Mallampati: I 
TM Distance: 4 - 6 cm Neck ROM: normal range of motion Mouth opening: Normal 
 
 Cardiovascular Dental 
 
Dentition: Lower partial plate and Full upper dentures Pulmonary Abdominal 
 
 
 
 Other Findings Anesthetic Plan ASA: 2 Anesthesia type: general 
 
 
 
 
Induction: Intravenous Anesthetic plan and risks discussed with: Patient

## 2018-11-05 NOTE — PROGRESS NOTES
32 [phone] - Patient arrives to unit at this time. Dual skin assessment performed at this time, all WDL except for several spots of ecchymosis on abdomen from lovenox injections. Admission completed at this time. Patient is A/O x 4. IV to right FA intact and patent. TEDS and SCDS applied to BLE. Silver mepilex dressing to right hip CDI. Patient denies numbness/tingling. Pedal and radial pulses palpable. Bowel sounds active. Lungs clear bilaterally. Pain 2/10. Patient was oriented to the room to include use of call bell, meal ordering, and use of incentive spirometer patient able to get up to 3000 on IS. Patient was given explanation of \" up for dinner\" program and has verbalized understanding. Phone and call bell left within reach. Plan of care for the day addressed with patient. Educated on pain medication availability and possible side effects. 1610-This nurse called [facility] to report that patient did not have orders in sign and held for pain meds, fluids, diet, etc. This nurse took telephone order with readback for regular diet, roxicodone 5-10 mg PO Q4 PRN, and zofran 4mg PRN Q4. [facility] PA to place the rest of the orders once he gets home.

## 2018-11-05 NOTE — ROUTINE PROCESS
TRANSFER - IN REPORT: 
 
Verbal report received from Carlitos Olivarez RN on Miguelina Bunch  being received from PACU for routine post - op. Report consisted of patients Situation, Background, Assessment and  
Recommendations(SBAR). Information from the following report(s) SBAR, Kardex, OR Summary, Intake/Output and MAR was reviewed with the receiving nurse. Opportunity for questions and clarification was provided. Assessment to be completed upon patients arrival to unit and care assumed.

## 2018-11-05 NOTE — PROGRESS NOTES
Problem: Mobility Impaired (Adult and Pediatric) Goal: *Acute Goals and Plan of Care (Insert Text) In 1-7 days pt will be able to perform: ST.  Bed mobility:  Rolling L to R to L modified independent for positioning. 2.  Supine to sit to supine S with HR for meals. 3.  Sit to stand to sit S with RW in prep for ambulation. LT.  Gait:  Ambulate >150ft S with RW, WBAT, for home/community mobility. 2.  Stair Negotiation:  Ascend/descend >2 steps CGA with HR for home entry. 3.  Activity tolerance: Tolerate up in chair 1-2 hours for ADLs. 4.  Patient/Family Education:  Patient/family to be independent with HEP for follow-up care and safe discharge. physical Therapy EVALUATION Patient: Michael Dickson (84 y.o. male) Date: 2018 Primary Diagnosis: UNILATERAL PRIMARY OSTEOARTHRITIS,RIGHT HIP Arthritis of right hip Procedure(s) (LRB): 
RIGHT TOTAL HIP REPLACEMENT ANTERIOR APPROACH W/C-ARM, \"SPEC POP\" (Right) Day of Surgery Precautions:   Fall, WBAT 
 
ASSESSMENT : 
Based on the objective data described below, the patient presents with decreased functional mobility and independence in regard to bed mobility, transfers, gt quality and tolerance, R hip strength, pain, stair negotiation and safety due to recent R FREDERICK surgery. Pt rating pain in R hip 1/10 on numerical pain scale. Pt required CGA/SBA for supine<>sit<>stand. Pt able to participate in gt training w/ RW, WBAT, GB and CGA in hallway w/ antalgic pattern. Pt returned to supine in bed w/ all needs within reach, SCDs applied and ice pack to R hip. Nurse Guerra Notice aware. Recommend Located within [facility] upon hospital d/c. Patient will benefit from skilled intervention to address the above impairments. Patients rehabilitation potential is considered to be Good Factors which may influence rehabilitation potential include:  
[]         None noted 
[]         Mental ability/status []         Medical condition []         Home/family situation and support systems 
[]         Safety awareness [x]         Pain tolerance/management 
[]         Other: PLAN : 
Recommendations and Planned Interventions: 
[x]           Bed Mobility Training             []    Neuromuscular Re-Education 
[x]           Transfer Training                   []    Orthotic/Prosthetic Training 
[x]           Gait Training                          []    Modalities [x]           Therapeutic Exercises          []    Edema Management/Control 
[x]           Therapeutic Activities            [x]    Patient and Family Training/Education 
[]           Other (comment): Frequency/Duration: Patient will be followed by physical therapy twice daily to address goals. Discharge Recommendations: Home Health Further Equipment Recommendations for Discharge: N/A  
 
SUBJECTIVE:  
Patient stated I have been waiting for you.  OBJECTIVE DATA SUMMARY:  
 
Past Medical History:  
Diagnosis Date  Arthritis  Coagulation disorder ([facility] [address] Eliquis  GERD (gastroesophageal reflux disease)  Ill-defined condition O2 drops down to 84 % O2 sat. after walking 25 yards due to Oxycodone use.  Localized osteoarthrosis of right hip 10/29/2018  Sleep apnea CPAP  Thromboembolus [facility]) 2004, 2011 DVT's and PE Past Surgical History:  
Procedure Laterality Date  HX ORTHOPAEDIC  2004  
 left hip replacement  HX ORTHOPAEDIC  2004  
 left knee arthroscopy  HX ORTHOPAEDIC  2005  
 left shoulder arthroscopy  NEUROLOGICAL PROCEDURE UNLISTED  2003  
 spinal stenosis L4-5 Barriers to Learning/Limitations: None Compensate with: visual, verbal, tactile, kinesthetic cues/model Prior Level of Function/Home Situation:  
Home Situation Home Environment: Private residence # Steps to Enter: 3 Rails to Enter: Yes Hand Rails : Left One/Two Story Residence: Two story # of Interior Steps: 15 Interior Rails: Right Lift Chair Available: No 
Living Alone: No 
Support Systems: Spouse/Significant Other/Partner, Family member(s) Patient Expects to be Discharged to[de-identified] Private residence Current DME Used/Available at Home: Cane, straight, Walker, rolling, CPAPCritical Behavior: 
Neurologic State: Alert; Appropriate for age Orientation Level: Oriented X4 Cognition: Appropriate decision making; Appropriate for age attention/concentration; Appropriate safety awareness; Follows commands Safety/Judgement: Awareness of environment Psychosocial 
Patient Behaviors: Calm; Cooperative Purposeful Interaction: Yes Pt Identified Daily Priority: Clinical issues (comment) [employer] Process: Nurture loving kindness; Teaching/learning;Create healing environment Caring Interventions: Reassure Reassure: Therapeutic listening; Acceptance;Caring roundsSkin Condition/Temp: Dry;Warm 
Skin Integrity: Incision (comment)(R hip) Skin Integumentary Skin Color: Appropriate for ethnicity Skin Condition/Temp: Dry;Warm 
Skin Integrity: Incision (comment)(R hip) Strength:   
Strength: Generally decreased, functional 
Tone & Sensation:  
Tone: Normal 
Sensation: Intact Range Of Motion: 
AROM: Generally decreased, functional 
Functional Mobility: 
Bed Mobility: 
Supine to Sit: Contact guard assistance(vc) 
Sit to Supine: Stand-by assistance(vc) 
Scooting: Stand-by assistance(vc) 
Transfers: 
Sit to Stand: Contact guard assistance(vc) 
Stand to Sit: Contact guard assistance(vc) 
Balance:  
Sitting: Intact Standing: Intact; With supportAmbulation/Gait Training: 
Distance (ft): 150 Feet (ft) Assistive Device: Walker, rolling;Gait belt Ambulation - Level of Assistance: Contact guard assistance(vc) 
Gait Abnormalities: Antalgic;Decreased step clearance Right Side Weight Bearing: As tolerated Base of Support: Shift to left Stance: Right decreased Speed/Catherine: Slow Step Length: Left shortened;Right shortened Swing Pattern: Left asymmetrical;Right asymmetrical 
Interventions: Safety awareness training; Tactile cues; Verbal cues; Visual/Demos Therapeutic Exercises: HEP written copy issued to pt per MD protocol. Pain: 
Pain Scale 1: Numeric (0 - 10) Pain Intensity 1: 3 Pain Location 1: Buttocks Pain Description 1: Aching Pain Intervention(s) 1: Medication (see MAR)(ordredf lyrica) Activity Tolerance:  
Fair Please refer to the flowsheet for vital signs taken during this treatment. After treatment:  
[]         Patient left in no apparent distress sitting up in chair 
[x]         Patient left in no apparent distress in bed 
[x]         Call bell left within reach [x]         Nursing notified 
[]         Caregiver present 
[]         Bed alarm activated COMMUNICATION/EDUCATION:  
[x]         Fall prevention education was provided and the patient/caregiver indicated understanding. [x]         Patient/family have participated as able in goal setting and plan of care. [x]         Patient/family agree to work toward stated goals and plan of care. []         Patient understands intent and goals of therapy, but is neutral about his/her participation. []         Patient is unable to participate in goal setting and plan of care. Thank you for this referral. 
Kathy Ratliff, PT Time Calculation: 35 mins Eval Complexity: History: HIGH Complexity :3+ comorbidities / personal factors will impact the outcome/ POC Exam:MEDIUM Complexity : 3 Standardized tests and measures addressing body structure, function, activity limitation and / or participation in recreation  Presentation: LOW Complexity : Stable, uncomplicated  Clinical Decision Making:Low Complexity amb >30' Overall Complexity:LOW Mobility [id number] Current  CI= 1-19%   Goal  CI= 1-19%. The severity rating is based on the Level of Assistance required for Functional Mobility and ADLs.

## 2018-11-05 NOTE — PROGRESS NOTES
Problem: Falls - Risk of 
Goal: *Absence of Falls Document Lorri Raymundo Fall Risk and appropriate interventions in the flowsheet. Outcome: Progressing Towards Goal 
Fall Risk Interventions: 
Mobility Interventions: Patient to call before getting OOB, PT Consult for mobility concerns, PT Consult for assist device competence, Strengthening exercises (ROM-active/passive), Utilize walker, cane, or other assistive device Mentation Interventions: Adequate sleep, hydration, pain control Medication Interventions: Patient to call before getting OOB, Teach patient to arise slowly Elimination Interventions: Call light in reach, Elevated toilet seat, Patient to call for help with toileting needs, Toilet paper/wipes in reach, Toileting schedule/hourly rounds, Urinal in reach

## 2018-11-05 NOTE — PERIOP NOTES
Patient transferred to room  208. Dave Mahajan RN assuming care. Blood pressure 144/90, pulse 66, temperature 97.8 °F (36.6 °C), resp. rate 16, height 6' 1\" (1.854 m), weight 111.4 kg (245 lb 9.6 oz), SpO2 99 %. Dual skin assessment completed.

## 2018-11-05 NOTE — ANESTHESIA POSTPROCEDURE EVALUATION
Post-Anesthesia Evaluation and Assessment Cardiovascular Function/Vital Signs Visit Vitals /79 Pulse 73 Temp 37.3 °C (99.1 °F) Resp 13 Ht 6' 1\" (1.854 m) Wt 111.4 kg (245 lb 9.6 oz) SpO2 95% BMI 32.40 kg/m² Patient is status post Procedure(s): RIGHT TOTAL HIP REPLACEMENT ANTERIOR APPROACH W/C-ARM, \"SPEC POP\". Nausea/Vomiting: Controlled. Postoperative hydration reviewed and adequate. Pain: 
Pain Scale 1: Numeric (0 - 10) (11/05/18 1504) Pain Intensity 1: 0 (11/05/18 1504) Managed. Neurological Status:  
Neuro (WDL): Within Defined Limits (11/05/18 1504) At baseline. Mental Status and Level of Consciousness: Arousable. Pulmonary Status:  
O2 Device: Nasal cannula (11/05/18 1504) Adequate oxygenation and airway patent. Complications related to anesthesia: None Post-anesthesia assessment completed. No concerns. Patient has met all discharge requirements. Signed By: Claudio Shin CRNA November 5, 2018

## 2018-11-05 NOTE — PROGRESS NOTES
Problem: Falls - Risk of 
Goal: *Absence of Falls Document Daniele Hernadez Fall Risk and appropriate interventions in the flowsheet. Outcome: Progressing Towards Goal 
Fall Risk Interventions: 
Mobility Interventions: Patient to call before getting OOB, PT Consult for mobility concerns, PT Consult for assist device competence, Strengthening exercises (ROM-active/passive), Utilize walker, cane, or other assistive device Mentation Interventions: Adequate sleep, hydration, pain control Medication Interventions: Patient to call before getting OOB, Teach patient to arise slowly Elimination Interventions: Call light in reach, Elevated toilet seat, Patient to call for help with toileting needs, Toilet paper/wipes in reach, Toileting schedule/hourly rounds, Urinal in reach

## 2018-11-06 ENCOUNTER — HOME HEALTH ADMISSION (OUTPATIENT)
Dept: HOME HEALTH SERVICES | Facility: HOME HEALTH | Age: 63
End: 2018-11-06
Payer: COMMERCIAL

## 2018-11-06 VITALS
RESPIRATION RATE: 15 BRPM | WEIGHT: 245.6 LBS | TEMPERATURE: 98.9 F | DIASTOLIC BLOOD PRESSURE: 64 MMHG | BODY MASS INDEX: 32.55 KG/M2 | SYSTOLIC BLOOD PRESSURE: 116 MMHG | HEIGHT: 73 IN | HEART RATE: 68 BPM | OXYGEN SATURATION: 95 %

## 2018-11-06 LAB
ANION GAP SERPL CALC-SCNC: 9 MMOL/L (ref 3–18)
BUN SERPL-MCNC: 11 MG/DL (ref 7–18)
BUN/CREAT SERPL: 12
CALCIUM SERPL-MCNC: 8 MG/DL (ref 8.5–10.1)
CHLORIDE SERPL-SCNC: 107 MMOL/L (ref 100–108)
CO2 SERPL-SCNC: 28 MMOL/L (ref 21–32)
CREAT SERPL-MCNC: 0.9 MG/DL (ref 0.6–1.3)
ERYTHROCYTE [DISTWIDTH] IN BLOOD BY AUTOMATED COUNT: 13.2 % (ref 11.6–14.5)
GLUCOSE SERPL-MCNC: 109 MG/DL (ref 74–99)
HCT VFR BLD AUTO: 35 % (ref 36–48)
HGB BLD-MCNC: 11.6 G/DL (ref 13–16)
MCH RBC QN AUTO: 28.6 PG (ref 24–34)
MCHC RBC AUTO-ENTMCNC: 33.1 G/DL (ref 31–37)
MCV RBC AUTO: 86.4 FL (ref 74–97)
PLATELET # BLD AUTO: 135 K/UL (ref 135–420)
PMV BLD AUTO: 9.9 FL (ref 9.2–11.8)
POTASSIUM SERPL-SCNC: 4.1 MMOL/L (ref 3.5–5.5)
RBC # BLD AUTO: 4.05 M/UL (ref 4.7–5.5)
SODIUM SERPL-SCNC: 144 MMOL/L (ref 136–145)
WBC # BLD AUTO: 10.1 K/UL (ref 4.6–13.2)

## 2018-11-06 PROCEDURE — 74011250636 HC RX REV CODE- 250/636: Performed by: PHYSICIAN ASSISTANT

## 2018-11-06 PROCEDURE — 74011250637 HC RX REV CODE- 250/637: Performed by: PHYSICIAN ASSISTANT

## 2018-11-06 PROCEDURE — 36415 COLL VENOUS BLD VENIPUNCTURE: CPT | Performed by: PHYSICIAN ASSISTANT

## 2018-11-06 PROCEDURE — 80048 BASIC METABOLIC PNL TOTAL CA: CPT | Performed by: PHYSICIAN ASSISTANT

## 2018-11-06 PROCEDURE — 97530 THERAPEUTIC ACTIVITIES: CPT

## 2018-11-06 PROCEDURE — 85027 COMPLETE CBC AUTOMATED: CPT | Performed by: PHYSICIAN ASSISTANT

## 2018-11-06 PROCEDURE — 97116 GAIT TRAINING THERAPY: CPT

## 2018-11-06 RX ADMIN — SODIUM CHLORIDE 125 ML/HR: 900 INJECTION, SOLUTION INTRAVENOUS at 03:10

## 2018-11-06 RX ADMIN — ACETAMINOPHEN 650 MG: 325 TABLET ORAL at 00:12

## 2018-11-06 RX ADMIN — OXYCODONE HYDROCHLORIDE 5 MG: 5 TABLET ORAL at 04:17

## 2018-11-06 RX ADMIN — OXYCODONE HYDROCHLORIDE 10 MG: 5 TABLET ORAL at 08:06

## 2018-11-06 RX ADMIN — OXYCODONE HYDROCHLORIDE 5 MG: 5 TABLET ORAL at 00:11

## 2018-11-06 RX ADMIN — ACETAMINOPHEN 650 MG: 325 TABLET ORAL at 06:24

## 2018-11-06 RX ADMIN — TIZANIDINE 4 MG: 4 TABLET ORAL at 06:24

## 2018-11-06 RX ADMIN — DOCUSATE SODIUM 100 MG: 100 CAPSULE, LIQUID FILLED ORAL at 08:05

## 2018-11-06 RX ADMIN — RANITIDINE 150 MG: 150 TABLET ORAL at 08:05

## 2018-11-06 RX ADMIN — Medication 2 G: at 04:17

## 2018-11-06 RX ADMIN — APIXABAN 2.5 MG: 2.5 TABLET, FILM COATED ORAL at 04:17

## 2018-11-06 RX ADMIN — PREGABALIN 150 MG: 75 CAPSULE ORAL at 08:05

## 2018-11-06 RX ADMIN — TIZANIDINE 4 MG: 4 TABLET ORAL at 00:12

## 2018-11-06 RX ADMIN — PANTOPRAZOLE SODIUM 40 MG: 40 TABLET, DELAYED RELEASE ORAL at 08:51

## 2018-11-06 RX ADMIN — FERROUS SULFATE TAB 325 MG (65 MG ELEMENTAL FE) 325 MG: 325 (65 FE) TAB at 08:06

## 2018-11-06 NOTE — PROGRESS NOTES
0806-Oxycodone 10 mg given for c/o pain rated 4/10 and in anticipation of PT. 
0809 - Patient in chair at this time. A/O x 4. IV to right forearm  intact and patent. SCDs and TEDs to bilateral legs. Mepilex dressing to right hip CDI. No numbness/tingling. Pedal pulses palpable. Lungs CTA. Bowel sounds active to all quadrants. Pain 4/10.  
0900-Pain decreased to 2/10. Dual AVS reviewed with Ismael MARTINEZ. All medications reviewed individually with patient. Opportunities for questions and concerns provided. Patient discharged via (mode of transport ie. Car, ambulance or air transport) car. Patient's arm band appropriately discarded.

## 2018-11-06 NOTE — PROGRESS NOTES
Transition of Care (MEGHANN) Plan:     Chart reviewed, met with pt in room prior to discharge home with wife. FOC offered, pt chose [facility] [phone] for follow up; referral placed with CMS. Pt has RW for home. MEGHANN Transportation: How is patient being transported at discharge? Family/Friend When? Once cleared by Therapy between 12-2pm 
 
 Is transport scheduled? N/A Follow-up appointment and transportation: PCP/Specialist?  See AVS for Appointment Who is transporting to the follow-up appointment? Family/Friend Is transport for follow up appointment scheduled? N/A Communication plan (with patient/family): Who is being called? Patient or Next of Kin? Responsible party? Patient What number(s) is to be used? See [employer] What service provider is calling for [facility] services? When are they calling? 24-48 hours following discharge Readmission Risk? (Green/Low; Yellow/Moderate; Red/High):  Eric Delgado Care Management Interventions PCP Verified by CM: Yes Transition of Care Consult (CM Consult): Home Health [address]: Yes Discharge Durable Medical Equipment: No 
Physical Therapy Consult: Yes Occupational Therapy Consult: Yes Current Support Network: Lives with Spouse, Own Home Confirm Follow Up Transport: Family Plan discussed with Pt/Family/Caregiver: Yes Freedom of Choice Offered: Yes Discharge Location Discharge Placement: Home with home health

## 2018-11-06 NOTE — PROGRESS NOTES
Problem: Mobility Impaired (Adult and Pediatric) Goal: *Acute Goals and Plan of Care (Insert Text) In 1-7 days pt will be able to perform: ST.  Bed mobility:  Rolling L to R to L modified independent for positioning. 2.  Supine to sit to supine S with HR for meals. 3.  Sit to stand to sit S with RW in prep for ambulation. LT.  Gait:  Ambulate >150ft S with RW, WBAT, for home/community mobility. 2.  Stair Negotiation:  Ascend/descend >2 steps CGA with HR for home entry. 3.  Activity tolerance: Tolerate up in chair 1-2 hours for ADLs. 4.  Patient/Family Education:  Patient/family to be independent with HEP for follow-up care and safe discharge. physical Therapy TREATMENT/DISCHARGE Patient: Melissa Rahman (32 y.o. male) Date: 2018 Diagnosis: UNILATERAL PRIMARY OSTEOARTHRITIS,RIGHT HIP Arthritis of right hip Localized osteoarthrosis of right hip Procedure(s) (LRB): 
RIGHT TOTAL HIP REPLACEMENT ANTERIOR APPROACH W/C-ARM, \"SPEC POP\" (Right) 1 Day Post-Op Precautions: Fall, WBAT Chart, physical therapy assessment, plan of care and goals were reviewed. ASSESSMENT: 
Pt rating pain in R hip 2/10 on numerical pain scale. Pt able to participate in transfer training, gt training, HEP and stair training meeting goals for this level of PT. Pt is ready to transition to HHPT. Pt left supine in bed w/ all needs within reach, Nurse Shana aware. Progression toward goals: 
[x]      Goals met 
[]      Improving appropriately and progressing toward goals 
[]      Improving slowly and progressing toward goals 
[]      Not making progress toward goals and plan of care will be adjusted PLAN: 
Patient will be discharged from physical therapy at this time. Rationale for discharge: 
[x] Goals Achieved 
[] [address] 
[] Patient not participating in therapy 
[] Other: 
Discharge Recommendations:  [address] [facility] Further Equipment Recommendations for Discharge:  N/A  
 
SUBJECTIVE:  
 Patient stated I am sore.  OBJECTIVE DATA SUMMARY:  
Critical Behavior: 
Neurologic State: Alert, Appropriate for age Orientation Level: Oriented X4 Cognition: Appropriate decision making, Appropriate safety awareness, Appropriate for age attention/concentration, Follows commands Safety/Judgement: Awareness of environment Functional Mobility Training: 
Bed Mobility: 
Supine to Sit: Supervision Sit to Supine: Supervision Scooting: Supervision Transfers: 
Sit to Stand: Supervision Stand to Sit: Supervision Balance: 
Sitting: Intact Standing: Intact; With supportAmbulation/Gait Training: 
Distance (ft): 160 Feet (ft) Assistive Device: Walker, rolling;Gait belt Ambulation - Level of Assistance: Supervision Gait Abnormalities: Antalgic;Decreased step clearance Right Side Weight Bearing: As tolerated Base of Support: Shift to left Stance: Right decreased Speed/Catherine: Slow Step Length: Left shortened;Right shortened Swing Pattern: Left asymmetrical;Right asymmetrical 
Interventions: Safety awareness training; Tactile cues; Verbal cues; Visual/Demos Stairs: 
Number of Stairs Trained: 10 Stairs - Level of Assistance: Supervision Rail Use: Both Neuro Re-Education: 
Therapeutic Exercises: HEP instructed. Pain: 
Pain Scale 1: Numeric (0 - 10) Pain Intensity 1: 2 Pain Location 1: Hip Pain Orientation 1: Right Pain Description 1: Aching; Sore Pain Intervention(s) 1: Medication (see MAR) Activity Tolerance:  
Fair Please refer to the flowsheet for vital signs taken during this treatment. After treatment:  
[] Patient left in no apparent distress sitting up in chair 
[x] Patient left in no apparent distress in bed 
[x] Call bell left within reach [x] Nursing notified 
[] Caregiver present 
[] Bed alarm activated Albertina Syed PT Time Calculation: 25 mins

## 2018-11-06 NOTE — ROUTINE PROCESS
Bedside and Verbal shift change report given to MANISH Armenta RN (oncoming nurse) by LYNNE Garrido RN (offgoing nurse). Report included the following information SBAR, Kardex, Intake/Output and MAR.

## 2018-11-06 NOTE — ROUTINE PROCESS
Bedside and Verbal shift change report given to Eileen Kessler RN (oncoming nurse) by Juan Forbes RN (offgoing nurse). Report included the following information SBAR, Kardex, Intake/Output, MAR and Med Rec Status.

## 2018-11-06 NOTE — PROGRESS NOTES
Problem: Falls - Risk of 
Goal: *Absence of Falls Document Mary Mcdonnell Fall Risk and appropriate interventions in the flowsheet. Outcome: Progressing Towards Goal 
Fall Risk Interventions: 
Mobility Interventions: Patient to call before getting OOB, PT Consult for mobility concerns, PT Consult for assist device competence, Utilize walker, cane, or other assistive device Mentation Interventions: Adequate sleep, hydration, pain control Medication Interventions: Patient to call before getting OOB, Teach patient to arise slowly Elimination Interventions: Call light in reach, Patient to call for help with toileting needs, Toileting schedule/hourly rounds, Urinal in reach

## 2018-11-06 NOTE — PROGRESS NOTES
Patient was visited by [facility]. Volunteer conducted a Spiritual Care Screening and reported no needs to this . Spiritual Care literature was provided. Chaplains will continue to follow and will provide pastoral care as needed or requested. [id number] Bullard M.Div. Board Certified [employer] [phone] - Office

## 2018-11-06 NOTE — ROUTINE PROCESS
1935 - Bedside and Verbal shift change report given to Sami Carlson RN by Mya Mott RN. Report included the following information SBAR, Kardex, OR Summary, Intake/Output and MAR.

## 2018-11-06 NOTE — PROGRESS NOTES
[address] [facility] care of patient at this time. Patient is alert and oriented x 4. Patient denies chest pain, shortness of breath, or numbness or tingling in the upper or lower extremities. Mepilex dressing on the right hip is clean, dry and intact. Sequential compression device and BRIDGETT hose in place on the patient bilaterally. 18g IV in the right forearm is patent and infusing NS @ 125mL/hr. Patient currently experiencing 3-4/10 pain. Bed is in lowest position with the wheels locked. Siderails x 3 are up. Call bell within reach. Patient is currently resting in bed in no apparent distress. Will continue to monitor. 2105 - Patient laying in bed resting quietly. No needs expressed at this time. Call bell within reach. Will continue to monitor. 2233 - Head to toe assessment performed at this time. Patient has no complaints of chest pain or shortness of breath. Denies any numbness or tingling in extremities. Lungs are clear to auscultation. Bowel sounds are present in all 4 quadrants, but hypoactive. 18g IV in the right forearm is patent and infusing with no signs of phlebitis or infiltration. Patient educated how to actively manage their pain. Patient encouraged to use the incentive spirometer. Patient educated on the side effects of medications. Explained the importance of ambulation. Instructed the patient not to attempt to get out of bed and/or ambulate without a staff member present. Patient verbalized understanding. Patient left in bed with call light within reach, bed in lowest position with wheels locked, and siderails x 3 up. Will continue to monitor. 2245 - Patient ambulated to the restroom and voided 700 cc of clear, yellow urine. Patient then returned to bed. Call bell within reach. Will continue to monitor. 0010 - Patient stated pain to be 3/10. Patient received PRN medication per the [facility]. Patient educated on the side effects of the medications. Patient encouraged to continue to actively manage pain and call for assistance. Will continue to monitor. 0140 - Patient laying in bed with eyes closed, breathing regularly and evenly. CPAP in place. Call bell within reach. Will continue to monitor. 8631 - Patient stated pain to be 3/10. Patient received PRN medication per the [facility]. Patient educated on the side effects of the medications. Patient encouraged to continue to actively manage pain and call for assistance. Will continue to monitor. 0630 - Patient ambulated to the restroom and voided 750 cc of clear, yellow urine. Patient now up in a chair for breakfast. IV fluids discontinued at this time. Call bell within reach. Will continue to monitor.

## 2018-11-06 NOTE — PROGRESS NOTES
Progress Note Patient: Michael Dickson MRN: [id number]  SSN: [id number] YOB: 1955  Age: 58 y.o. Sex: male 1 Day Post-Op status post Procedure(s) (LRB): 
RIGHT TOTAL HIP REPLACEMENT ANTERIOR APPROACH W/C-ARM, \"SPEC POP\" (Right) Admit Date: 2018 Admit Diagnosis: UNILATERAL PRIMARY OSTEOARTHRITIS,RIGHT HIP Arthritis of right hip Subjective:   
 
Doing well. No complaints. No SOB. No Chest Pain. No Nausea or Vomiting. No problems eating or voiding. Objective:  
  
 
Temp (24hrs), Av.4 °F (36.9 °C), Min:97.7 °F (36.5 °C), Max:99.2 °F (37.3 °C) Body mass index is 32.4 kg/m². Patient Vitals for the past 12 hrs: 
 BP Temp Pulse Resp SpO2  
18 0708 116/64 98.9 °F (37.2 °C) 68 15 95 % 18 0417 111/63 99.2 °F (37.3 °C) 62 14 98 % 18 0007 110/63 98 °F (36.7 °C) 60 14 94 % Recent Labs 18 
0405 HGB 11.6* HCT 35.0*   
K 4.1  CO2 28 BUN 11  
CREA 0.90 * Physical Exam: 
Vital Signs are Stable. No Acute Distress. Alert and Oriented. Negative Homans sign. Toes AROM Full. Neurovascular exam is normal.   
Dressing is Clean, Dry, and Intact. Assessment/Plan:  
 
Stable s/p thr 
oob with rehab 
1. D/c planning Continue PT/OT Discharge Plan: Home with Home Health Signed By: Francesco Gee MD   
 2018

## 2018-11-07 ENCOUNTER — HOME CARE VISIT (OUTPATIENT)
Dept: SCHEDULING | Facility: HOME HEALTH | Age: 63
End: 2018-11-07
Payer: COMMERCIAL

## 2018-11-07 VITALS
BODY MASS INDEX: 48.1 KG/M2 | RESPIRATION RATE: 16 BRPM | HEIGHT: 60 IN | TEMPERATURE: 97.9 F | HEART RATE: 68 BPM | OXYGEN SATURATION: 99 % | SYSTOLIC BLOOD PRESSURE: 112 MMHG | WEIGHT: 245 LBS | DIASTOLIC BLOOD PRESSURE: 64 MMHG

## 2018-11-07 VITALS
HEART RATE: 72 BPM | OXYGEN SATURATION: 97 % | TEMPERATURE: 98.4 F | DIASTOLIC BLOOD PRESSURE: 56 MMHG | SYSTOLIC BLOOD PRESSURE: 110 MMHG

## 2018-11-07 PROCEDURE — G0299 HHS/HOSPICE OF RN EA 15 MIN: HCPCS

## 2018-11-07 PROCEDURE — A6213 FOAM DRG >16<=48 SQ IN W/BDR: HCPCS

## 2018-11-07 PROCEDURE — 400013 HH SOC

## 2018-11-07 PROCEDURE — G0151 HHCP-SERV OF PT,EA 15 MIN: HCPCS

## 2018-11-08 ENCOUNTER — HOME CARE VISIT (OUTPATIENT)
Dept: HOME HEALTH SERVICES | Facility: HOME HEALTH | Age: 63
End: 2018-11-08
Payer: COMMERCIAL

## 2018-11-09 ENCOUNTER — APPOINTMENT (OUTPATIENT)
Dept: VASCULAR SURGERY | Age: 63
End: 2018-11-09
Attending: EMERGENCY MEDICINE
Payer: COMMERCIAL

## 2018-11-09 ENCOUNTER — HOME CARE VISIT (OUTPATIENT)
Dept: SCHEDULING | Facility: HOME HEALTH | Age: 63
End: 2018-11-09
Payer: COMMERCIAL

## 2018-11-09 ENCOUNTER — HOSPITAL ENCOUNTER (EMERGENCY)
Age: 63
Discharge: HOME | End: 2018-11-09
Attending: EMERGENCY MEDICINE
Payer: COMMERCIAL

## 2018-11-09 VITALS
TEMPERATURE: 97.9 F | HEIGHT: 73 IN | BODY MASS INDEX: 32.6 KG/M2 | WEIGHT: 246 LBS | OXYGEN SATURATION: 96 % | HEART RATE: 78 BPM | DIASTOLIC BLOOD PRESSURE: 68 MMHG | SYSTOLIC BLOOD PRESSURE: 128 MMHG | RESPIRATION RATE: 17 BRPM

## 2018-11-09 VITALS
HEART RATE: 64 BPM | DIASTOLIC BLOOD PRESSURE: 60 MMHG | TEMPERATURE: 98.2 F | SYSTOLIC BLOOD PRESSURE: 122 MMHG | OXYGEN SATURATION: 97 %

## 2018-11-09 DIAGNOSIS — I87.2 VENOUS INSUFFICIENCY: Primary | ICD-10-CM

## 2018-11-09 LAB
ANION GAP SERPL CALC-SCNC: 8 MMOL/L (ref 3–18)
BASOPHILS # BLD: 0 K/UL (ref 0–0.1)
BASOPHILS NFR BLD: 1 % (ref 0–2)
BNP SERPL-MCNC: 150 PG/ML (ref 0–900)
BUN SERPL-MCNC: 9 MG/DL (ref 7–18)
BUN/CREAT SERPL: 10
CALCIUM SERPL-MCNC: 8.3 MG/DL (ref 8.5–10.1)
CHLORIDE SERPL-SCNC: 101 MMOL/L (ref 100–108)
CO2 SERPL-SCNC: 28 MMOL/L (ref 21–32)
CREAT SERPL-MCNC: 0.89 MG/DL (ref 0.6–1.3)
DIFFERENTIAL METHOD BLD: ABNORMAL
EOSINOPHIL # BLD: 0.2 K/UL (ref 0–0.4)
EOSINOPHIL NFR BLD: 3 % (ref 0–5)
ERYTHROCYTE [DISTWIDTH] IN BLOOD BY AUTOMATED COUNT: 13 % (ref 11.6–14.5)
ERYTHROCYTE [SEDIMENTATION RATE] IN BLOOD: 68 MM/HR (ref 0–20)
GLUCOSE SERPL-MCNC: 101 MG/DL (ref 74–99)
HCT VFR BLD AUTO: 32.8 % (ref 36–48)
HGB BLD-MCNC: 11 G/DL (ref 13–16)
LYMPHOCYTES # BLD: 1.8 K/UL (ref 0.9–3.6)
LYMPHOCYTES NFR BLD: 29 % (ref 21–52)
MCH RBC QN AUTO: 28.9 PG (ref 24–34)
MCHC RBC AUTO-ENTMCNC: 33.5 G/DL (ref 31–37)
MCV RBC AUTO: 86.1 FL (ref 74–97)
MONOCYTES # BLD: 0.8 K/UL (ref 0.05–1.2)
MONOCYTES NFR BLD: 13 % (ref 3–10)
NEUTS SEG # BLD: 3.3 K/UL (ref 1.8–8)
NEUTS SEG NFR BLD: 54 % (ref 40–73)
PLATELET # BLD AUTO: 155 K/UL (ref 135–420)
PMV BLD AUTO: 9.7 FL (ref 9.2–11.8)
POTASSIUM SERPL-SCNC: 3.6 MMOL/L (ref 3.5–5.5)
RBC # BLD AUTO: 3.81 M/UL (ref 4.7–5.5)
SODIUM SERPL-SCNC: 137 MMOL/L (ref 136–145)
WBC # BLD AUTO: 6.1 K/UL (ref 4.6–13.2)

## 2018-11-09 PROCEDURE — G0157 HHC PT ASSISTANT EA 15: HCPCS

## 2018-11-09 PROCEDURE — 99284 EMERGENCY DEPT VISIT MOD MDM: CPT

## 2018-11-09 PROCEDURE — 86140 C-REACTIVE PROTEIN: CPT

## 2018-11-09 PROCEDURE — 93971 EXTREMITY STUDY: CPT

## 2018-11-09 PROCEDURE — 83880 ASSAY OF NATRIURETIC PEPTIDE: CPT

## 2018-11-09 PROCEDURE — 74011250637 HC RX REV CODE- 250/637: Performed by: EMERGENCY MEDICINE

## 2018-11-09 PROCEDURE — 85025 COMPLETE CBC W/AUTO DIFF WBC: CPT

## 2018-11-09 PROCEDURE — 80048 BASIC METABOLIC PNL TOTAL CA: CPT

## 2018-11-09 PROCEDURE — 85652 RBC SED RATE AUTOMATED: CPT

## 2018-11-09 RX ORDER — OXYCODONE AND ACETAMINOPHEN 5; 325 MG/1; MG/1
2 TABLET ORAL
Status: COMPLETED | OUTPATIENT
Start: 2018-11-09 | End: 2018-11-09

## 2018-11-09 RX ADMIN — OXYCODONE HYDROCHLORIDE AND ACETAMINOPHEN 2 TABLET: 5; 325 TABLET ORAL at 22:16

## 2018-11-10 ENCOUNTER — HOME CARE VISIT (OUTPATIENT)
Dept: SCHEDULING | Facility: HOME HEALTH | Age: 63
End: 2018-11-10
Payer: COMMERCIAL

## 2018-11-10 VITALS
TEMPERATURE: 97.1 F | OXYGEN SATURATION: 98 % | RESPIRATION RATE: 16 BRPM | DIASTOLIC BLOOD PRESSURE: 80 MMHG | HEART RATE: 72 BPM | SYSTOLIC BLOOD PRESSURE: 128 MMHG

## 2018-11-10 LAB — CRP SERPL-MCNC: 8.9 MG/DL (ref 0–0.3)

## 2018-11-10 PROCEDURE — G0299 HHS/HOSPICE OF RN EA 15 MIN: HCPCS

## 2018-11-10 NOTE — ED TRIAGE NOTES
Pt c/o swelling to L leg. Pt had a hip replacement 11/05/2018 by Dr Francisco Levine reports the swelling in his entire R leg is increasing. Pt denies shortness of breath. No other complaints at this time.

## 2018-11-10 NOTE — DISCHARGE INSTRUCTIONS
Venous Insufficiency    WHAT YOU NEED TO KNOW:    Venous insufficiency is a condition that prevents blood from flowing out of your legs and back to your heart. Veins contain valves that help blood flow in one direction. Venous insufficiency means the valves do not close correctly or fully. Blood flows back and pools in your leg. This can cause problems such as varicose veins. Venous insufficiency may also be called chronic venous insufficiency or venous stasis. DISCHARGE INSTRUCTIONS:    Seek care immediately if:    · You have a wound that does not heal or is infected. · You have an injury that has broken your skin and caused your varicose veins to bleed. · Your leg is swollen and hard. · You have pain in your leg that does not go away or gets worse. · Your legs or feet are turning blue or black. · Your leg feels warm, tender, and painful. It may look swollen and red. Contact your healthcare provider if:  · You have a fever. · You have varicose veins and they are painful. · You have new or worsening leg pain, swelling, or redness. · You have new or worsening ulcers or other sores on your leg. · You have questions or concerns about your condition or care. Medicines:     · Medicines may be given treat a bacterial infection or to reduce swelling or itching. You may need to apply a topical medicine to a skin ulcer. · Take your medicine as directed. Contact your healthcare provider if you think your medicine is not helping or if you have side effects. Tell him or her if you are allergic to any medicine. Keep a list of the medicines, vitamins, and herbs you take. Include the amounts, and when and why you take them. Bring the list or the pill bottles to follow-up visits. Carry your medicine list with you in case of an emergency. Manage your symptoms:    · Wear pressure stockings as directed. Pressure stockings help keep blood from pooling in your leg veins.  Your healthcare provider can prescribe stockings that are right for you. Do not buy over-the-counter pressure stockings unless your healthcare provider says it is okay. They may not fit correctly or may have elastic that cuts off your circulation. Ask your healthcare provider when to start wearing pressure stockings and how long to wear them each day. · Pressure Stockings   · Do not sit or stand for long periods of time. If you have to sit for a long time, flex and extend your legs, feet, and ankles. Do this about 10 times every 30 minutes to help keep blood flowing. If you have to stand for a long time, take breaks and sit with your legs elevated. · Elevate your legs. Elevate your legs above the level of your heart to reduce swelling. Your healthcare provider may recommend that you keep your legs elevated for 30 minutes at a time. You may need to do this 3 to 4 times per day, or more if your healthcare provider recommends. · Do not smoke. Nicotine and other chemicals in cigarettes and cigars can cause blood vessel damage. Ask your healthcare provider for information if you currently smoke and need help to quit. E-cigarettes or smokeless tobacco still contain nicotine. Talk to your healthcare provider before you use these products. · Reach or maintain a healthy weight. Extra weight can make venous insufficiency worse. Ask your healthcare provider how much you should weigh. He can help you create a weight loss plan if you need to lose weight. · Exercise as directed. Walking can help increase blood flow in your calves. Ask your healthcare provider how much exercise you need each day and which exercises are best for you. · Care for your skin. Keep your skin clean. Do not use any soaps or lotions that may dry your skin. For example, do not use products that contain fragrance or alcohol. If you have a skin ulcer, your healthcare provider may recommend a wet-to-dry bandage. To do this, apply a wet bandage to your wound and allow it to dry.  This will help remove drainage from your wound each time you change the bandage. Your healthcare provider will tell you how often to change your bandage and which kind of bandage to use. Check your wound for signs of infection, such as swelling or pus. · Go to physical therapy (PT) as directed. A physical therapist can help you increase movement and range of motion in your legs. Follow up with your healthcare provider as directed: Write down your questions so you remember to ask them during your visits.

## 2018-11-10 NOTE — ED NOTES
RN provided pts wife with a meal per pts request. Doppler at bedside doing study now. Pt reports his pain is starting to return.

## 2018-11-10 NOTE — ED NOTES
Patient armband removed and shredded I have reviewed discharge instructions with the patient and spouse. The patient and spouse verbalized understanding.

## 2018-11-10 NOTE — ED PROVIDER NOTES
EMERGENCY DEPARTMENT HISTORY AND PHYSICAL EXAM 
 
Date: 11/9/2018 Patient Name: Dov Mitchell History of Presenting Illness Chief Complaint Patient presents with  
 Hip Pain  Post-Op Problem History Provided By: Patient Chief Complaint: Right leg swelling Duration: 2 days Timing:  Worsening Location: Right leg Quality: Swollen Associated Symptoms: denies any other associated signs or symptoms Additional History (Context):  
7:19 PM 
Dov Mitchell is a 58 y.o. male with PMHx of DVT and PE who presents to the emergency department C/O right leg swelling, onset 4 days ago, 20 mins after surgery. Pt had right hip replacement 4 days ago by Dr. Susu Davis. Pt called Dr. Marika Calderón office due to increased swelling and spoke with the PA-EMIL before coming to the ER. Pt reports pain is well managed with medications. Pt is on Eliquis, last took 5 hours ago. No H/O CHF. Pt denies leg pain, SOB, fevers, or any other sxs or complaints. PCP: Derrek Ramirez MD 
 
Current Outpatient Medications Medication Sig Dispense Refill  lidocaine-kinesiology tape 5 % kit Apply 1 Dose to affected area daily.  oxyCODONE-acetaminophen (PERCOCET) 5-325 mg per tablet Take 1 to 2 tab PO q 4-6 hrs prn pain (Patient taking differently: Take 1-2 Tabs by mouth every four (4) hours as needed for Pain. Take 1 to 2 tab PO q 4 hrs prn pain) 60 Tab 0  
 raNITIdine hcl 150 mg capsule Take 150 mg by mouth two (2) times a day.  tiZANidine (ZANAFLEX) 4 mg capsule Take 4 mg by mouth every six (6) hours.  pregabalin (LYRICA) 150 mg capsule Take 150 mg by mouth four (4) times daily.  apixaban (ELIQUIS) 2.5 mg tablet Take 2.5 mg by mouth two (2) times a day.  traZODone (DESYREL) 100 mg tablet Take 100 mg by mouth nightly.  lovastatin (MEVACOR) 40 mg tablet Take 40 mg by mouth nightly.  melatonin tab tablet Take 10 mg by mouth nightly.  sildenafil citrate (VIAGRA) 100 mg tablet Take 50 mg by mouth as needed.  ketoconazole (NIZORAL) 2 % shampoo Apply  to affected area daily as needed for Itching.  oxyCODONE IR (ROXICODONE) 10 mg tab immediate release tablet Take 10 mg by mouth every eight (8) hours as needed.  enoxaparin (LOVENOX) 120 mg/0.8 mL injection 120 mg by SubCUTAneous route two (2) times a day. Pt to start Lovenox 5 days prior to surgery once daily, stopping on Nov 4. Per Dr. Gray Black per pt to bridge Pt started on 10/31/18 Past History Past Medical History: 
Past Medical History:  
Diagnosis Date  Arthritis  Coagulation disorder ([address] Eliquis  GERD (gastroesophageal reflux disease)  Ill-defined condition O2 drops down to 84 % O2 sat. after walking 25 yards due to Oxycodone use.  Localized osteoarthrosis of right hip 10/29/2018  Sleep apnea CPAP  Thromboembolus [facility]) 2004, 2011 DVT's and PE Past Surgical History: 
Past Surgical History:  
Procedure Laterality Date  HX ORTHOPAEDIC  2004  
 left hip replacement  HX ORTHOPAEDIC  2004  
 left knee arthroscopy  HX ORTHOPAEDIC  2005  
 left shoulder arthroscopy  NEUROLOGICAL PROCEDURE UNLISTED  2003  
 spinal stenosis L4-5 Family History: 
History reviewed. No pertinent family history. Social History: 
Social History Tobacco Use  Smoking status: Former Smoker Last attempt to quit: 10/24/2003 Years since quitting: 15.0  Smokeless tobacco: Never Used Substance Use Topics  Alcohol use: No  
  Frequency: Never  Drug use: No  
 
 
Allergies: Allergies Allergen Reactions  Hydrocodone Itching  Soma [Carisoprodol] Unknown (comments) Pt can't remember  Sulfa (Sulfonamide Antibiotics) Other (comments) Skin peels Review of Systems Review of Systems Constitutional: Negative for fever. Respiratory: Negative for shortness of breath. Cardiovascular: Positive for leg swelling (right). Musculoskeletal: Negative for myalgias. All other systems reviewed and are negative. Physical Exam  
 
Vitals:  
 11/09/18 1859 11/09/18 2200 11/09/18 2217 BP:  136/76 134/76 Pulse: 74  78 Resp: 17  17 Temp: 97.9 °F (36.6 °C)  97.9 °F (36.6 °C) SpO2: 98% 98% (!) 87% Weight: 111.6 kg (246 lb) Height: 6' 1\" (1.854 m) Physical Exam  
Constitutional: He is oriented to person, place, and time. He appears well-developed and well-nourished. No distress. Obese male in no acute distress HENT:  
Head: Normocephalic and atraumatic. Eyes: Pupils are equal, round, and reactive to light. Neck: Neck supple. Cardiovascular: Normal rate, regular rhythm, S1 normal, S2 normal and normal heart sounds. Pulmonary/Chest: Breath sounds normal. No respiratory distress. He has no wheezes. He has no rales. He exhibits no tenderness. Abdominal: Soft. He exhibits no distension and no mass. There is no tenderness. There is no guarding. Abdomen is obese, soft, non-tender Musculoskeletal: Normal range of motion. He exhibits no edema or tenderness. Clean right hip surgical wound and dressing over the wound with no exudates. No sign of infection or induration. The entire right leg is slightly swollen 2+. Kwok's sign was negative. Neurovascularly the leg is intact. No knee swelling or tenderness. Full ROM of the knee and hip. Neurological: He is alert and oriented to person, place, and time. No cranial nerve deficit. Skin: No rash noted. Psychiatric: He has a normal mood and affect. His behavior is normal. Thought content normal.  
Nursing note and vitals reviewed. Diagnostic Study Results Labs - Recent Results (from the past 12 hour(s)) CBC WITH AUTOMATED DIFF Collection Time: 11/09/18  7:53 PM  
Result Value Ref Range WBC 6.1 4.6 - 13.2 K/uL  
 RBC 3.81 (L) 4.70 - 5.50 M/uL  
 HGB 11.0 (L) 13.0 - 16.0 g/dL HCT 32.8 (L) 36.0 - 48.0 % MCV 86.1 74.0 - 97.0 FL  
 MCH 28.9 24.0 - 34.0 PG  
 MCHC 33.5 31.0 - 37.0 g/dL  
 RDW 13.0 11.6 - 14.5 % PLATELET 876 107 - 221 K/uL MPV 9.7 9.2 - 11.8 FL  
 NEUTROPHILS 54 40 - 73 % LYMPHOCYTES 29 21 - 52 % MONOCYTES 13 (H) 3 - 10 % EOSINOPHILS 3 0 - 5 % BASOPHILS 1 0 - 2 %  
 ABS. NEUTROPHILS 3.3 1.8 - 8.0 K/UL  
 ABS. LYMPHOCYTES 1.8 0.9 - 3.6 K/UL  
 ABS. MONOCYTES 0.8 0.05 - 1.2 K/UL  
 ABS. EOSINOPHILS 0.2 0.0 - 0.4 K/UL  
 ABS. BASOPHILS 0.0 0.0 - 0.1 K/UL  
 DF AUTOMATED METABOLIC PANEL, BASIC Collection Time: 11/09/18  7:53 PM  
Result Value Ref Range Sodium 137 136 - 145 mmol/L Potassium 3.6 3.5 - 5.5 mmol/L Chloride 101 100 - 108 mmol/L  
 CO2 28 21 - 32 mmol/L Anion gap 8 3.0 - 18 mmol/L Glucose 101 (H) 74 - 99 mg/dL BUN 9 7.0 - 18 MG/DL Creatinine 0.89 0.6 - 1.3 MG/DL  
 BUN/Creatinine ratio 10 GFR est AA >60 >60 ml/min/1.73m2 GFR est non-AA >60 >60 ml/min/1.73m2 Calcium 8.3 (L) 8.5 - 10.1 MG/DL  
NT-PRO BNP Collection Time: 11/09/18  7:53 PM  
Result Value Ref Range NT pro- 0 - 900 PG/ML  
SED RATE (ESR) Collection Time: 11/09/18  7:53 PM  
Result Value Ref Range Sed rate, automated 68 (H) 0 - 20 mm/hr Radiologic Studies - No orders to display CT Results  (Last 48 hours) None CXR Results  (Last 48 hours) None Medical Decision Making I am the first provider for this patient. I reviewed the vital signs, available nursing notes, past medical history, past surgical history, family history and social history. Vital Signs-Reviewed the patient's vital signs. Pulse Oximetry Analysis - 98% on RA Records Reviewed: Nursing Notes and Old Medical Records Provider Notes (Medical Decision Making):  
INITIAL CLINICAL IMPRESSION and PLANS: 
The patient presents with the primary complaint(s) of: right leg swelling. The presentation, to include historical aspects and clinical findings are consistent with the DX of right leg swelling. However, other possible DX's to consider as primary, associated with, or exacerbated by include: 1. DVT 2. Cellulitis 3. Venous insufficiency 4. Baker's cyst 
5. Post-op infection Recorded by Nirav Bullock ED Scribe, as dictated by Gerson Everett MD. 
 
Procedures: 
Procedures MEDICATIONS GIVEN: 
Medications  
oxyCODONE-acetaminophen (PERCOCET) 5-325 mg per tablet 2 Tab (2 Tabs Oral Given 11/9/18 2216) ED Course:  
7:19 PM  
Initial assessment performed. The patients presenting problems have been discussed, and they are in agreement with the care plan formulated and outlined with them. I have encouraged them to ask questions as they arise throughout their visit. Diagnosis and Disposition DISCHARGE NOTE: 
10:59 PM 
Kodi Gates's  results have been reviewed with him. He has been counseled regarding his diagnosis, treatment, and plan. He verbally conveys understanding and agreement of the signs, symptoms, diagnosis, treatment and prognosis and additionally agrees to follow up as discussed. He also agrees with the care-plan and conveys that all of his questions have been answered. I have also provided discharge instructions for him that include: educational information regarding their diagnosis and treatment, and list of reasons why they would want to return to the ED prior to their follow-up appointment, should his condition change. He has been provided with education for proper emergency department utilization. CLINICAL IMPRESSION: 
 
1. Venous insufficiency PLAN: 
1. D/C Home 2. Current Discharge Medication List  
  
 
3. Follow-up Information Follow up With Specialties Details Why Contact Info  Bradley Reyes MD Orthopedic Surgery Schedule an appointment as soon as possible for a visit in 1 day For follow up with orthopedic surgery [address] [place] [address] [address] [facility] 
[phone] [facility] EMERGENCY DEPT Emergency Medicine  As needed, If symptoms worsen [address] [place] [id number] 
[phone]  
  
 
_______________________________ Attestations: This note is prepared by Amauri Blankenship, acting as Scribe for Beatrice Cain MD. Beatrice Cain MD:  The scribe's documentation has been prepared under my direction and personally reviewed by me in its entirety. I confirm that the note above accurately reflects all work, treatment, procedures, and medical decision making performed by me. 
_______________________________

## 2018-11-12 ENCOUNTER — HOME CARE VISIT (OUTPATIENT)
Dept: SCHEDULING | Facility: HOME HEALTH | Age: 63
End: 2018-11-12
Payer: COMMERCIAL

## 2018-11-12 VITALS
DIASTOLIC BLOOD PRESSURE: 66 MMHG | HEART RATE: 70 BPM | OXYGEN SATURATION: 97 % | TEMPERATURE: 98.6 F | SYSTOLIC BLOOD PRESSURE: 122 MMHG

## 2018-11-12 PROCEDURE — G0157 HHC PT ASSISTANT EA 15: HCPCS

## 2018-11-14 ENCOUNTER — HOME CARE VISIT (OUTPATIENT)
Dept: SCHEDULING | Facility: HOME HEALTH | Age: 63
End: 2018-11-14
Payer: COMMERCIAL

## 2018-11-14 VITALS
DIASTOLIC BLOOD PRESSURE: 70 MMHG | OXYGEN SATURATION: 96 % | HEART RATE: 78 BPM | TEMPERATURE: 98.8 F | SYSTOLIC BLOOD PRESSURE: 126 MMHG

## 2018-11-14 PROCEDURE — G0157 HHC PT ASSISTANT EA 15: HCPCS

## 2018-11-15 ENCOUNTER — HOME CARE VISIT (OUTPATIENT)
Dept: SCHEDULING | Facility: HOME HEALTH | Age: 63
End: 2018-11-15
Payer: COMMERCIAL

## 2018-11-15 VITALS
DIASTOLIC BLOOD PRESSURE: 70 MMHG | RESPIRATION RATE: 14 BRPM | TEMPERATURE: 98.9 F | SYSTOLIC BLOOD PRESSURE: 122 MMHG | OXYGEN SATURATION: 98 % | HEART RATE: 68 BPM

## 2018-11-15 PROCEDURE — G0299 HHS/HOSPICE OF RN EA 15 MIN: HCPCS

## 2018-11-16 ENCOUNTER — HOME CARE VISIT (OUTPATIENT)
Dept: HOME HEALTH SERVICES | Facility: HOME HEALTH | Age: 63
End: 2018-11-16
Payer: COMMERCIAL

## 2018-11-16 ENCOUNTER — HOME CARE VISIT (OUTPATIENT)
Dept: SCHEDULING | Facility: HOME HEALTH | Age: 63
End: 2018-11-16
Payer: COMMERCIAL

## 2018-11-16 VITALS
OXYGEN SATURATION: 99 % | TEMPERATURE: 98.1 F | SYSTOLIC BLOOD PRESSURE: 118 MMHG | DIASTOLIC BLOOD PRESSURE: 62 MMHG | HEART RATE: 62 BPM

## 2018-11-16 PROCEDURE — G0157 HHC PT ASSISTANT EA 15: HCPCS

## 2018-11-20 ENCOUNTER — HOME CARE VISIT (OUTPATIENT)
Dept: HOME HEALTH SERVICES | Facility: HOME HEALTH | Age: 63
End: 2018-11-20
Payer: COMMERCIAL

## 2018-11-20 PROCEDURE — G0299 HHS/HOSPICE OF RN EA 15 MIN: HCPCS

## 2018-11-21 VITALS
OXYGEN SATURATION: 98 % | HEART RATE: 71 BPM | SYSTOLIC BLOOD PRESSURE: 132 MMHG | TEMPERATURE: 99.2 F | RESPIRATION RATE: 14 BRPM | DIASTOLIC BLOOD PRESSURE: 70 MMHG

## 2021-05-29 ENCOUNTER — RECORDS - HEALTHEAST (OUTPATIENT)
Dept: ADMINISTRATIVE | Facility: CLINIC | Age: 66
End: 2021-05-29

## (undated) DEVICE — TRAY PHAR SYR 30ML PLAS LUERLOCK TIP N CTRL CONVENIENCE

## (undated) DEVICE — SUT VCRL + 1 36IN CT1 VIO --

## (undated) DEVICE — HANDPIECE SET WITH FAN SPRAY TIP: Brand: INTERPULSE

## (undated) DEVICE — BLADE SAW 1.27X13X90 MM FOR LG BNE

## (undated) DEVICE — SOL INJ L R 1000ML BG --

## (undated) DEVICE — NDL PRT INJ NSAF BLNT 18GX1.5 --

## (undated) DEVICE — (D)PREP SKN CHLRAPRP APPL 26ML -- CONVERT TO ITEM 371833

## (undated) DEVICE — SUTURE STRATAFIX SPRL MCRYL + SZ 3-0 L18IN ABSRB UD PS-2 SXMP1B107

## (undated) DEVICE — REM POLYHESIVE ADULT PATIENT RETURN ELECTRODE: Brand: VALLEYLAB

## (undated) DEVICE — ADHESIVE SKIN CLOSURE 4X22 CM PREMIERPRO EXOFINFUSION DISP

## (undated) DEVICE — KENDALL SCD EXPRESS SLEEVES, KNEE LENGTH, MEDIUM: Brand: KENDALL SCD

## (undated) DEVICE — Device

## (undated) DEVICE — THE CANADY HYBRID PLASMA SCALPEL IS AN ELECTROSURGICAL PLASMA SCALPEL THAT USES AN 85MM BENDABLE PADDLE BLADE TIP. THE ELECTROSURGICAL PLASMA SCALPEL IS USED TO SIMULTANEOUSLY CUT AND COAGULATE BIOLOGICAL TISSUE.: Brand: CANADY HYBRID PLASMA PADDLE BLADE

## (undated) DEVICE — SYR 3ML LL TIP 1/10ML GRAD --

## (undated) DEVICE — GOWN,SIRUS,NONRNF,SETINSLV,XL,20/CS: Brand: MEDLINE

## (undated) DEVICE — SYR 10ML LUER LOK 1/5ML GRAD --

## (undated) DEVICE — DRSG MEPILEX BORDER AG 4X8 -- 5/BX

## (undated) DEVICE — PACK PROCEDURE SURG TOT HIP ANTR CARTER CUST

## (undated) DEVICE — NEEDLE SPNL 20GA L3.5IN YEL HUB S STL REG WALL FIT STYL W/

## (undated) DEVICE — SOLUTION SCRB 4OZ 10% PVP I POVIDONE IOD TOP PAINT EXIDINE

## (undated) DEVICE — SOL IRRIGATION INJ NACL 0.9% 500ML BTL